# Patient Record
Sex: FEMALE | Race: WHITE | NOT HISPANIC OR LATINO | Employment: FULL TIME | URBAN - METROPOLITAN AREA
[De-identification: names, ages, dates, MRNs, and addresses within clinical notes are randomized per-mention and may not be internally consistent; named-entity substitution may affect disease eponyms.]

---

## 2017-04-07 ENCOUNTER — OFFICE VISIT (OUTPATIENT)
Dept: FAMILY MEDICINE | Facility: CLINIC | Age: 37
End: 2017-04-07
Payer: COMMERCIAL

## 2017-04-07 VITALS
TEMPERATURE: 98.1 F | DIASTOLIC BLOOD PRESSURE: 80 MMHG | RESPIRATION RATE: 16 BRPM | HEIGHT: 66 IN | SYSTOLIC BLOOD PRESSURE: 134 MMHG | OXYGEN SATURATION: 98 % | HEART RATE: 65 BPM | WEIGHT: 166 LBS | BODY MASS INDEX: 26.68 KG/M2

## 2017-04-07 DIAGNOSIS — K21.9 GASTROESOPHAGEAL REFLUX DISEASE WITHOUT ESOPHAGITIS: Primary | ICD-10-CM

## 2017-04-07 DIAGNOSIS — Z82.49 FAMILY HISTORY OF ISCHEMIC HEART DISEASE: ICD-10-CM

## 2017-04-07 DIAGNOSIS — F33.1 MAJOR DEPRESSIVE DISORDER, RECURRENT EPISODE, MODERATE (H): ICD-10-CM

## 2017-04-07 PROCEDURE — 99203 OFFICE O/P NEW LOW 30 MIN: CPT | Performed by: FAMILY MEDICINE

## 2017-04-07 PROCEDURE — 80061 LIPID PANEL: CPT | Performed by: FAMILY MEDICINE

## 2017-04-07 PROCEDURE — 36415 COLL VENOUS BLD VENIPUNCTURE: CPT | Performed by: FAMILY MEDICINE

## 2017-04-07 PROCEDURE — 82947 ASSAY GLUCOSE BLOOD QUANT: CPT | Performed by: FAMILY MEDICINE

## 2017-04-07 RX ORDER — FAMOTIDINE 20 MG
TABLET ORAL
COMMUNITY
End: 2018-12-11

## 2017-04-07 RX ORDER — OMEPRAZOLE 10 MG/1
CAPSULE, DELAYED RELEASE ORAL DAILY
COMMUNITY
End: 2018-03-23

## 2017-04-07 RX ORDER — GLUCOSAMINE SULFATE 500 MG
CAPSULE ORAL
COMMUNITY
End: 2018-12-11

## 2017-04-07 NOTE — NURSING NOTE
"Chief Complaint   Patient presents with     Establish Care     Blood Draw     fasting labs       Initial /80 (BP Location: Right arm, Patient Position: Chair, Cuff Size: Adult Regular)  Pulse 65  Temp 98.1  F (36.7  C) (Oral)  Resp 16  Ht 5' 6\" (1.676 m)  Wt 166 lb (75.3 kg)  SpO2 98%  BMI 26.79 kg/m2 Estimated body mass index is 26.79 kg/(m^2) as calculated from the following:    Height as of this encounter: 5' 6\" (1.676 m).    Weight as of this encounter: 166 lb (75.3 kg).  Medication Reconciliation: complete   Shayy Coates, STELLA      "

## 2017-04-07 NOTE — LETTER
[facility]  [id number] [place] [id number]  Phone: [phone]    April 7, 2017        January Dickens  [address] [facility] [address]          To whom it may concern:    RE: January Dcikens    Patient was seen and treated today at our clinic.  Pt will need more time for assignment due to death in the family, and recent travel out side the country.     Please contact me for questions or concerns.      Sincerely,        Len Matthews MD

## 2017-04-07 NOTE — MR AVS SNAPSHOT
"              After Visit Summary   4/7/2017    January Dickens    MRN: [id number]           Patient Information     Date Of Birth          1980        Visit Information        Provider Department      4/7/2017 10:45 AM Len Matthews MD [facility]        Today's Diagnoses     Gastroesophageal reflux disease without esophagitis    -  1    Major depressive disorder, recurrent episode, moderate (H)        Family history of ischemic heart disease           Follow-ups after your visit        Who to contact     If you have questions or need follow up information about today's clinic visit or your schedule please contact [facility] directly at [phone].  Normal or non-critical lab and imaging results will be communicated to you by MyChart, letter or phone within 4 business days after the clinic has received the results. If you do not hear from us within 7 days, please contact the clinic through [employer] or phone. If you have a critical or abnormal lab result, we will notify you by phone as soon as possible.  Submit refill requests through [employer] or call your pharmacy and they will forward the refill request to us. Please allow 3 business days for your refill to be completed.          Additional Information About Your Visit        MyChart Information     [employer] gives you secure access to your electronic health record. If you see a primary care provider, you can also send messages to your care team and make appointments. If you have questions, please call your primary care clinic.  If you do not have a primary care provider, please call [phone] and they will assist you.        Care EveryWhere ID     This is your Care EveryWhere ID. This could be used by other organizations to access your [place] medical records  [id number]        Your Vitals Were     Pulse Temperature Respirations Height Pulse Oximetry BMI (Body Mass Index)    65 98.1  F (36.7  C) (Oral) 16 5' 6\" (1.676 m) " 98% 26.79 kg/m2       Blood Pressure from Last 3 Encounters:   04/07/17 134/80   11/26/07 112/78   08/30/07 118/74    Weight from Last 3 Encounters:   04/07/17 166 lb (75.3 kg)   11/26/07 164 lb (74.4 kg)   08/30/07 160 lb (72.6 kg)              We Performed the Following     Glucose     Lipid Profile with reflex to direct LDL        Primary Care Provider Office Phone # Fax #    Len Matthews [phone]       [facility] [address] [facility] [address]        Thank you!     Thank you for choosing [facility]  for your care. Our goal is always to provide you with excellent care. Hearing back from our patients is one way we can continue to improve our services. Please take a few minutes to complete the written survey that you may receive in the mail after your visit with us. Thank you!             Your Updated Medication List - Protect others around you: Learn how to safely use, store and throw away your medicines at www.disposemymeds.org.          This list is accurate as of: 4/7/17 11:26 AM.  Always use your most recent med list.                   Brand Name Dispense Instructions for use    DIPROLENE 0.05 % lotion   Generic drug:  betamethasone (augmented)     60ml    apply to affected area twice daily       FLUoxetine 20 MG capsule    PROzac     Take 20 mg by mouth daily       glucosamine 500 MG Caps          omeprazole 10 MG CR capsule    priLOSEC     Take by mouth daily       TRIAMCINOLONE ACETONIDE (TOP) 0.1 % Crea     60    apply to affected area twice daily       Vitamin D (Cholecalciferol) 1000 UNITS Caps

## 2017-04-07 NOTE — Clinical Note
Please abstract the following data from this visit with this patient into the appropriate field in Epic:  Pap smear done on this date: 11/2016 (approximately), by this group: in [place], results were normal.
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
## 2017-04-08 LAB
CHOLEST SERPL-MCNC: 222 MG/DL
GLUCOSE SERPL-MCNC: 82 MG/DL (ref 70–99)
HDLC SERPL-MCNC: 55 MG/DL
LDLC SERPL CALC-MCNC: 158 MG/DL
NONHDLC SERPL-MCNC: 167 MG/DL
TRIGL SERPL-MCNC: 46 MG/DL

## 2017-04-10 ENCOUNTER — TELEPHONE (OUTPATIENT)
Dept: FAMILY MEDICINE | Facility: CLINIC | Age: 37
End: 2017-04-10

## 2017-04-10 DIAGNOSIS — F33.1 MAJOR DEPRESSIVE DISORDER, RECURRENT EPISODE, MODERATE (H): Primary | ICD-10-CM

## 2017-04-10 NOTE — LETTER
My Depression Action Plan  Name: January Dickens   Date of Birth 1980  Date: 4/14/2017    My doctor: Len Matthews   My clinic: [address] [address] [phone]          GREEN    ZONE   Good Control    What it looks like:     Things are going generally well. You have normal up s and down s. You may even feel depressed from time to time, but bad moods usually last less than a day.   What you need to do:  1. Continue to care for yourself (see self care plan)  2. Check your depression survival kit and update it as needed  3. Follow your physician s recommendations including any medication.  4. Do not stop taking medication unless you consult with your physician first.           YELLOW         ZONE Getting Worse    What it looks like:     Depression is starting to interfere with your life.     It may be hard to get out of bed; you may be starting to isolate yourself from others.    Symptoms of depression are starting to last most all day and this has happened for several days.     You may have suicidal thoughts but they are not constant.   What you need to do:     1. Call your care team, your response to treatment will improve if you keep your care team informed of your progress. Yellow periods are signs an adjustment may need to be made.     2. Continue your self-care, even if you have to fake it!    3. Talk to someone in your support network    4. Open up your depression survival kit           RED    ZONE Medical Alert - Get Help    What it looks like:     Depression is seriously interfering with your life.     You may experience these or other symptoms: You can t get out of bed most days, can t work or engage in other necessary activities, you have trouble taking care of basic hygiene, or basic responsibilities, thoughts of suicide or death that will not go away, self-injurious behavior.     What you need to do:  1. Call your care team  and request a same-day appointment. If they are not available (weekends or after hours) call your local crisis line, emergency room or 911.      Electronically signed by: Shayy Coates, April 14, 2017    Depression Self Care Plan / Survival Kit    Self-Care for Depression  Here s the deal. Your body and mind are really not as separate as most people think.  What you do and think affects how you feel and how you feel influences what you do and think. This means if you do things that people who feel good do, it will help you feel better.  Sometimes this is all it takes.  There is also a place for medication and therapy depending on how severe your depression is, so be sure to consult with your medical provider and/ or Behavioral Health Consultant if your symptoms are worsening or not improving.     In order to better manage my stress, I will:    Exercise  Get some form of exercise, every day. This will help reduce pain and release endorphins, the  feel good  chemicals in your brain. This is almost as good as taking antidepressants!  This is not the same as joining a gym and then never going! (they count on that by the way ) It can be as simple as just going for a walk or doing some gardening, anything that will get you moving.      Hygiene   Maintain good hygiene (Get out of bed in the morning, Make your bed, Brush your teeth, Take a shower, and Get dressed like you were going to work, even if you are unemployed).  If your clothes don't fit try to get ones that do.    Diet  I will strive to eat foods that are good for me, drink plenty of water, and avoid excessive sugar, caffeine, alcohol, and other mood-altering substances.  Some foods that are helpful in depression are: complex carbohydrates, B vitamins, flaxseed, fish or fish oil, fresh fruits and vegetables.    Psychotherapy  I agree to participate in Individual Therapy (if recommended).    Medication  If prescribed medications, I agree to take them.  Missing  doses can result in serious side effects.  I understand that drinking alcohol, or other illicit drug use, may cause potential side effects.  I will not stop my medication abruptly without first discussing it with my provider.    Staying Connected With Others  I will stay in touch with my friends, family members, and my primary care provider/team.    Use your imagination  Be creative.  We all have a creative side; it doesn t matter if it s oil painting, sand castles, or mud pies! This will also kick up the endorphins.    Witness Beauty  (AKA stop and smell the roses) Take a look outside, even in mid-winter. Notice colors, textures. Watch the squirrels and birds.     Service to others  Be of service to others.  There is always someone else in need.  By helping others we can  get out of ourselves  and remember the really important things.  This also provides opportunities for practicing all the other parts of the program.    Humor  Laugh and be silly!  Adjust your TV habits for less news and crime-drama and more comedy.    Control your stress  Try breathing deep, massage therapy, biofeedback, and meditation. Find time to relax each day.     My support system    Clinic Contact:  Phone number:    Contact 1:  Phone number:    Contact 2:  Phone number:    Rastafari/:  Phone number:    Therapist:  Phone number:    Local crisis center:    Phone number:    Other community support:  Phone number:

## 2017-04-11 NOTE — TELEPHONE ENCOUNTER
Panel Management Review       Patient has the following on her problem list:   Depression / Dysthymia review  No flowsheet data found.   Patient is due for:  PHQ9 and DAP      Composite cancer screening  Chart review shows that this patient is due/due soon for the following None  Summary:    Patient is due/failing the following:   PHQ9    Action needed:   Patient needs to do PHQ9.    Type of outreach:    Phone, left message for patient to call back.     Questions for provider review:    None                                                                                                                                    Sophie Washington CMA       Chart routed to Care Team .

## 2017-04-15 ASSESSMENT — PATIENT HEALTH QUESTIONNAIRE - PHQ9: SUM OF ALL RESPONSES TO PHQ QUESTIONS 1-9: 3

## 2017-05-05 ENCOUNTER — TELEPHONE (OUTPATIENT)
Dept: FAMILY MEDICINE | Facility: CLINIC | Age: 37
End: 2017-05-05

## 2017-05-05 NOTE — TELEPHONE ENCOUNTER
Patient calling for lab results.  Advised of below and discussed.  Has already started and lost good amount of weight.  Does not know how to get into her Mychart.  Gave Help Desk #.  Will reset her account.  Jane Gomez RN    Entered by Len Matthews MD at 4/8/2017 10:12 AM   Kamilla January, your cholesterol is slightly elevated, please make sure you start weight loss plan, exercise 3 hours/week, and start eating more vegetables, fruits, nuts, and fish products (twice weekly), and eat less of processed food, fast food, white bread/pasta, baked sweets, bagels,and fried food.   Saying that, your cholesterol is better than previous. Your blood sugar is normal.   Len Matthews MD   [facility]   [phone]

## 2017-05-22 ENCOUNTER — OFFICE VISIT (OUTPATIENT)
Dept: FAMILY MEDICINE | Facility: CLINIC | Age: 37
End: 2017-05-22
Payer: COMMERCIAL

## 2017-05-22 VITALS
HEIGHT: 66 IN | HEART RATE: 83 BPM | SYSTOLIC BLOOD PRESSURE: 132 MMHG | DIASTOLIC BLOOD PRESSURE: 84 MMHG | BODY MASS INDEX: 26.68 KG/M2 | RESPIRATION RATE: 20 BRPM | TEMPERATURE: 98.8 F | WEIGHT: 166 LBS | OXYGEN SATURATION: 95 %

## 2017-05-22 DIAGNOSIS — M62.838 NECK MUSCLE SPASM: Primary | ICD-10-CM

## 2017-05-22 PROCEDURE — 99213 OFFICE O/P EST LOW 20 MIN: CPT | Performed by: FAMILY MEDICINE
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
## 2017-05-22 NOTE — LETTER
[facility]  [id number] [place] [address]  Phone: [phone]    May 22, 2017        January Dickens  [address] [facility] [address]          To whom it may concern:    RE: January Dickens    Patient was seen and treated today at our clinic and missed work starting 5/20/2017  Patient may return to work 5/23/2107 with the following:  Light duty- no heavy lifting more than 10 pounds, no over head lifting more than 5 pounds.  When the patient returns to work, the following restrictions apply until :6/1/2017      Please contact me for questions or concerns.      Sincerely,        Len Matthews MD

## 2017-05-22 NOTE — MR AVS SNAPSHOT
"              After Visit Summary   5/22/2017    January Dickens    MRN: [id number]           Patient Information     Date Of Birth          1980        Visit Information        Provider Department      5/22/2017 10:30 AM Len Matthews MD [facility]        Today's Diagnoses     Neck muscle spasm    -  1       Follow-ups after your visit        Who to contact     If you have questions or need follow up information about today's clinic visit or your schedule please contact [facility] directly at [phone].  Normal or non-critical lab and imaging results will be communicated to you by MyChart, letter or phone within 4 business days after the clinic has received the results. If you do not hear from us within 7 days, please contact the clinic through [employer] or phone. If you have a critical or abnormal lab result, we will notify you by phone as soon as possible.  Submit refill requests through Ox[employer] or call your pharmacy and they will forward the refill request to us. Please allow 3 business days for your refill to be completed.          Additional Information About Your Visit        MyChart Information     Oxxy gives you secure access to your electronic health record. If you see a primary care provider, you can also send messages to your care team and make appointments. If you have questions, please call your primary care clinic.  If you do not have a primary care provider, please call [phone] and they will assist you.        Care EveryWhere ID     This is your Care EveryWhere ID. This could be used by other organizations to access your [place] medical records  [id number]        Your Vitals Were     Pulse Temperature Respirations Height Pulse Oximetry BMI (Body Mass Index)    83 98.8  F (37.1  C) (Oral) 20 5' 6\" (1.676 m) 95% 26.79 kg/m2       Blood Pressure from Last 3 Encounters:   05/22/17 132/84   04/07/17 134/80   11/26/07 112/78    Weight from Last 3 " Encounters:   05/22/17 166 lb (75.3 kg)   04/07/17 166 lb (75.3 kg)   11/26/07 164 lb (74.4 kg)              Today, you had the following     No orders found for display         Today's Medication Changes          These changes are accurate as of: 5/22/17 11:02 AM.  If you have any questions, ask your nurse or doctor.               Start taking these medicines.        Dose/Directions    diclofenac 50 MG EC tablet   Commonly known as:  VOLTAREN   Used for:  Neck muscle spasm   Started by:  Len Matthews MD        Dose:  50 mg   Take 1 tablet (50 mg) by mouth 3 times daily as needed for moderate pain   Quantity:  90 tablet   Refills:  1       tiZANidine 4 MG tablet   Commonly known as:  ZANAFLEX   Used for:  Neck muscle spasm   Started by:  Len Matthews MD        Dose:  4 mg   Take 1 tablet (4 mg) by mouth 3 times daily   Quantity:  30 tablet   Refills:  0            Where to get your medicines      These medications were sent to [address] [address]     Phone:  [phone]     diclofenac 50 MG EC tablet    tiZANidine 4 MG tablet                Primary Care Provider Office Phone # Fax #    Len Matthews [phone]       [address] [address]        Thank you!     Thank you for choosing [facility]  for your care. Our goal is always to provide you with excellent care. Hearing back from our patients is one way we can continue to improve our services. Please take a few minutes to complete the written survey that you may receive in the mail after your visit with us. Thank you!             Your Updated Medication List - Protect others around you: Learn how to safely use, store and throw away your medicines at www.disposemymeds.org.          This list is accurate as of: 5/22/17 11:02 AM.  Always use your most recent med list.                   Brand Name Dispense  Instructions for use    diclofenac 50 MG EC tablet    VOLTAREN    90 tablet    Take 1 tablet (50 mg) by mouth 3 times daily as needed for moderate pain       DIPROLENE 0.05 % lotion   Generic drug:  betamethasone (augmented)     60ml    apply to affected area twice daily       FLUoxetine 20 MG capsule    PROzac     Take 20 mg by mouth daily       glucosamine 500 MG Caps          omeprazole 10 MG CR capsule    priLOSEC     Take by mouth daily       tiZANidine 4 MG tablet    ZANAFLEX    30 tablet    Take 1 tablet (4 mg) by mouth 3 times daily       TRIAMCINOLONE ACETONIDE (TOP) 0.1 % Crea     60    apply to affected area twice daily       Vitamin D (Cholecalciferol) 1000 UNITS Caps

## 2017-05-22 NOTE — NURSING NOTE
"Chief Complaint   Patient presents with     Neck Pain     x6 days       Initial /84 (BP Location: Right arm, Patient Position: Chair, Cuff Size: Adult Regular)  Pulse 83  Temp 98.8  F (37.1  C) (Oral)  Resp 20  Ht 5' 6\" (1.676 m)  Wt 166 lb (75.3 kg)  SpO2 95%  BMI 26.79 kg/m2 Estimated body mass index is 26.79 kg/(m^2) as calculated from the following:    Height as of this encounter: 5' 6\" (1.676 m).    Weight as of this encounter: 166 lb (75.3 kg).  Medication Reconciliation: complete   Shayy Coates, STELLA      "

## 2017-08-08 ENCOUNTER — OFFICE VISIT (OUTPATIENT)
Dept: FAMILY MEDICINE | Facility: CLINIC | Age: 37
End: 2017-08-08
Payer: COMMERCIAL

## 2017-08-08 VITALS
DIASTOLIC BLOOD PRESSURE: 87 MMHG | SYSTOLIC BLOOD PRESSURE: 128 MMHG | TEMPERATURE: 98.2 F | OXYGEN SATURATION: 98 % | BODY MASS INDEX: 26.2 KG/M2 | RESPIRATION RATE: 16 BRPM | HEART RATE: 54 BPM | WEIGHT: 163 LBS | HEIGHT: 66 IN

## 2017-08-08 DIAGNOSIS — F41.1 GAD (GENERALIZED ANXIETY DISORDER): Primary | ICD-10-CM

## 2017-08-08 PROCEDURE — 99213 OFFICE O/P EST LOW 20 MIN: CPT | Performed by: FAMILY MEDICINE

## 2017-08-08 RX ORDER — FLUOXETINE 10 MG/1
10 CAPSULE ORAL DAILY
Qty: 14 CAPSULE | Refills: 1 | Status: SHIPPED | OUTPATIENT
Start: 2017-08-08 | End: 2018-03-23

## 2017-08-08 ASSESSMENT — ANXIETY QUESTIONNAIRES
6. BECOMING EASILY ANNOYED OR IRRITABLE: SEVERAL DAYS
GAD7 TOTAL SCORE: 4
2. NOT BEING ABLE TO STOP OR CONTROL WORRYING: SEVERAL DAYS
3. WORRYING TOO MUCH ABOUT DIFFERENT THINGS: SEVERAL DAYS
7. FEELING AFRAID AS IF SOMETHING AWFUL MIGHT HAPPEN: NOT AT ALL
1. FEELING NERVOUS, ANXIOUS, OR ON EDGE: SEVERAL DAYS
IF YOU CHECKED OFF ANY PROBLEMS ON THIS QUESTIONNAIRE, HOW DIFFICULT HAVE THESE PROBLEMS MADE IT FOR YOU TO DO YOUR WORK, TAKE CARE OF THINGS AT HOME, OR GET ALONG WITH OTHER PEOPLE: NOT DIFFICULT AT ALL
5. BEING SO RESTLESS THAT IT IS HARD TO SIT STILL: NOT AT ALL

## 2017-08-08 ASSESSMENT — PATIENT HEALTH QUESTIONNAIRE - PHQ9
5. POOR APPETITE OR OVEREATING: NOT AT ALL
SUM OF ALL RESPONSES TO PHQ QUESTIONS 1-9: 3

## 2017-08-08 NOTE — LETTER
[address] [address]  Phone: [phone]    August 8, 2017        January Dickens  [address] [facility] [address]          To whom it may concern:    RE: January Dickens    Patient was seen and treated today at our clinic for Major depression and anxiety symptoms.    Please contact me for questions or concerns.      Sincerely,        Len Matthews MD

## 2017-08-08 NOTE — NURSING NOTE
"Chief Complaint   Patient presents with     Medication Question     would like to stop taking Prozac       Initial /87 (BP Location: Right arm, Patient Position: Chair, Cuff Size: Adult Regular)  Pulse 54  Temp 98.2  F (36.8  C) (Oral)  Resp 16  Ht 5' 6\" (1.676 m)  Wt 163 lb (73.9 kg)  SpO2 98%  BMI 26.31 kg/m2 Estimated body mass index is 26.31 kg/(m^2) as calculated from the following:    Height as of this encounter: 5' 6\" (1.676 m).    Weight as of this encounter: 163 lb (73.9 kg).  Medication Reconciliation: complete   Shayy Coates, STELLA      "

## 2017-08-08 NOTE — MR AVS SNAPSHOT
"              After Visit Summary   8/8/2017    January Dickens    MRN: [id number]           Patient Information     Date Of Birth          1980        Visit Information        Provider Department      8/8/2017 9:30 AM Len Matthews MD [facility]        Today's Diagnoses     MERI (generalized anxiety disorder)    -  1       Follow-ups after your visit        Who to contact     If you have questions or need follow up information about today's clinic visit or your schedule please contact [facility] directly at [phone].  Normal or non-critical lab and imaging results will be communicated to you by whodoyouhart, letter or phone within 4 business days after the clinic has received the results. If you do not hear from us within 7 days, please contact the clinic through [employer] or phone. If you have a critical or abnormal lab result, we will notify you by phone as soon as possible.  Submit refill requests through [employer] or call your pharmacy and they will forward the refill request to us. Please allow 3 business days for your refill to be completed.          Additional Information About Your Visit        MyChart Information     [employer] gives you secure access to your electronic health record. If you see a primary care provider, you can also send messages to your care team and make appointments. If you have questions, please call your primary care clinic.  If you do not have a primary care provider, please call [phone] and they will assist you.        Care EveryWhere ID     This is your Care EveryWhere ID. This could be used by other organizations to access your [place] medical records  [id number]        Your Vitals Were     Pulse Temperature Respirations Height Pulse Oximetry BMI (Body Mass Index)    54 98.2  F (36.8  C) (Oral) 16 5' 6\" (1.676 m) 98% 26.31 kg/m2       Blood Pressure from Last 3 Encounters:   08/08/17 128/87   05/22/17 132/84   04/07/17 134/80    Weight from " Last 3 Encounters:   08/08/17 163 lb (73.9 kg)   05/22/17 166 lb (75.3 kg)   04/07/17 166 lb (75.3 kg)              Today, you had the following     No orders found for display         Today's Medication Changes          These changes are accurate as of: 8/8/17  9:56 AM.  If you have any questions, ask your nurse or doctor.               These medicines have changed or have updated prescriptions.        Dose/Directions    * FLUoxetine 20 MG capsule   Commonly known as:  PROzac   This may have changed:  Another medication with the same name was added. Make sure you understand how and when to take each.   Changed by:  Len Matthews MD        Dose:  20 mg   Take 20 mg by mouth daily   Refills:  0       * FLUoxetine 10 MG capsule   Commonly known as:  PROzac   This may have changed:  You were already taking a medication with the same name, and this prescription was added. Make sure you understand how and when to take each.   Used for:  MERI (generalized anxiety disorder)   Changed by:  Len Matthews MD        Dose:  10 mg   Take 1 capsule (10 mg) by mouth daily   Quantity:  14 capsule   Refills:  1       * Notice:  This list has 2 medication(s) that are the same as other medications prescribed for you. Read the directions carefully, and ask your doctor or other care provider to review them with you.         Where to get your medicines      These medications were sent to [address] [address]     Phone:  [phone]     FLUoxetine 10 MG capsule                Primary Care Provider Office Phone # Fax #    Len Matthews [phone]       [address] [address]        Equal Access to Services     CANDY MANJARREZ : june Escalona, taylor hernandez. So [facility] [phone].    ATENCIÓN: Si aster andino pennington  disposición servicios gratuitos de asistencia lingüística. Jean-Paul carr [phone].    We comply with applicable federal civil rights laws and [place] laws. We do not discriminate on the basis of race, color, national origin, age, disability sex, sexual orientation or gender identity.            Thank you!     Thank you for choosing [facility]  for your care. Our goal is always to provide you with excellent care. Hearing back from our patients is one way we can continue to improve our services. Please take a few minutes to complete the written survey that you may receive in the mail after your visit with us. Thank you!             Your Updated Medication List - Protect others around you: Learn how to safely use, store and throw away your medicines at www.disposemymeds.org.          This list is accurate as of: 8/8/17  9:56 AM.  Always use your most recent med list.                   Brand Name Dispense Instructions for use Diagnosis    DIPROLENE 0.05 % lotion   Generic drug:  betamethasone (augmented)     60ml    apply to affected area twice daily    Other psoriasis       * FLUoxetine 20 MG capsule    PROzac     Take 20 mg by mouth daily        * FLUoxetine 10 MG capsule    PROzac    14 capsule    Take 1 capsule (10 mg) by mouth daily    MERI (generalized anxiety disorder)       glucosamine 500 MG Caps           omeprazole 10 MG CR capsule    priLOSEC     Take by mouth daily        TRIAMCINOLONE ACETONIDE (TOP) 0.1 % Crea     60    apply to affected area twice daily    Other psoriasis       Vitamin D (Cholecalciferol) 1000 UNITS Caps           * Notice:  This list has 2 medication(s) that are the same as other medications prescribed for you. Read the directions carefully, and ask your doctor or other care provider to review them with you.

## 2017-08-09 ASSESSMENT — ANXIETY QUESTIONNAIRES: GAD7 TOTAL SCORE: 4

## 2018-03-23 ENCOUNTER — OFFICE VISIT (OUTPATIENT)
Dept: FAMILY MEDICINE | Facility: CLINIC | Age: 38
End: 2018-03-23
Payer: COMMERCIAL

## 2018-03-23 VITALS
HEART RATE: 76 BPM | TEMPERATURE: 98.3 F | WEIGHT: 157 LBS | DIASTOLIC BLOOD PRESSURE: 85 MMHG | HEIGHT: 66 IN | BODY MASS INDEX: 25.23 KG/M2 | SYSTOLIC BLOOD PRESSURE: 134 MMHG

## 2018-03-23 DIAGNOSIS — Z00.00 ROUTINE GENERAL MEDICAL EXAMINATION AT A HEALTH CARE FACILITY: Primary | ICD-10-CM

## 2018-03-23 DIAGNOSIS — H81.13 BENIGN PAROXYSMAL POSITIONAL VERTIGO DUE TO BILATERAL VESTIBULAR DISORDER: ICD-10-CM

## 2018-03-23 DIAGNOSIS — F33.1 MAJOR DEPRESSIVE DISORDER, RECURRENT EPISODE, MODERATE (H): ICD-10-CM

## 2018-03-23 DIAGNOSIS — Z23 NEED FOR PROPHYLACTIC VACCINATION WITH TETANUS-DIPHTHERIA (TD): ICD-10-CM

## 2018-03-23 PROBLEM — R20.0 RIGHT LEG NUMBNESS: Status: ACTIVE | Noted: 2018-03-23

## 2018-03-23 LAB
ERYTHROCYTE [DISTWIDTH] IN BLOOD BY AUTOMATED COUNT: 11.3 % (ref 10–15)
HCT VFR BLD AUTO: 39.7 % (ref 35–47)
HGB BLD-MCNC: 13.8 G/DL (ref 11.7–15.7)
MCH RBC QN AUTO: 32.9 PG (ref 26.5–33)
MCHC RBC AUTO-ENTMCNC: 34.8 G/DL (ref 31.5–36.5)
MCV RBC AUTO: 95 FL (ref 78–100)
PLATELET # BLD AUTO: 272 10E9/L (ref 150–450)
RBC # BLD AUTO: 4.2 10E12/L (ref 3.8–5.2)
WBC # BLD AUTO: 7 10E9/L (ref 4–11)

## 2018-03-23 PROCEDURE — 85027 COMPLETE CBC AUTOMATED: CPT | Performed by: FAMILY MEDICINE

## 2018-03-23 PROCEDURE — 99395 PREV VISIT EST AGE 18-39: CPT | Performed by: FAMILY MEDICINE

## 2018-03-23 PROCEDURE — 36415 COLL VENOUS BLD VENIPUNCTURE: CPT | Performed by: FAMILY MEDICINE

## 2018-03-23 PROCEDURE — 80061 LIPID PANEL: CPT | Performed by: FAMILY MEDICINE

## 2018-03-23 PROCEDURE — 99213 OFFICE O/P EST LOW 20 MIN: CPT | Mod: 25 | Performed by: FAMILY MEDICINE
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
## 2018-03-24 LAB
CHOLEST SERPL-MCNC: 217 MG/DL
HDLC SERPL-MCNC: 45 MG/DL
LDLC SERPL CALC-MCNC: 156 MG/DL
NONHDLC SERPL-MCNC: 172 MG/DL
TRIGL SERPL-MCNC: 80 MG/DL

## 2018-12-11 ENCOUNTER — OFFICE VISIT (OUTPATIENT)
Dept: FAMILY MEDICINE | Facility: CLINIC | Age: 38
End: 2018-12-11
Payer: COMMERCIAL

## 2018-12-11 VITALS
HEIGHT: 66 IN | OXYGEN SATURATION: 100 % | RESPIRATION RATE: 16 BRPM | HEART RATE: 68 BPM | SYSTOLIC BLOOD PRESSURE: 117 MMHG | DIASTOLIC BLOOD PRESSURE: 79 MMHG | BODY MASS INDEX: 24.75 KG/M2 | WEIGHT: 154 LBS | TEMPERATURE: 98.3 F

## 2018-12-11 DIAGNOSIS — L40.9 PSORIASIS: ICD-10-CM

## 2018-12-11 PROCEDURE — 99213 OFFICE O/P EST LOW 20 MIN: CPT | Performed by: FAMILY MEDICINE

## 2018-12-11 RX ORDER — BETAMETHASONE DIPROPIONATE 0.5 MG/ML
LOTION, AUGMENTED TOPICAL 2 TIMES DAILY
Qty: 60 ML | Refills: 11 | Status: SHIPPED | OUTPATIENT
Start: 2018-12-11 | End: 2020-01-08

## 2018-12-11 RX ORDER — TRIAMCINOLONE ACETONIDE 1 MG/G
CREAM TOPICAL 2 TIMES DAILY
Qty: 85.2 G | Refills: 3 | Status: SHIPPED | OUTPATIENT
Start: 2018-12-11 | End: 2020-03-31

## 2018-12-11 ASSESSMENT — MIFFLIN-ST. JEOR: SCORE: 1395.29
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
## 2019-05-14 ENCOUNTER — MYC REFILL (OUTPATIENT)
Dept: FAMILY MEDICINE | Facility: CLINIC | Age: 39
End: 2019-05-14

## 2019-05-14 DIAGNOSIS — L40.9 PSORIASIS: ICD-10-CM

## 2019-05-15 RX ORDER — BETAMETHASONE DIPROPIONATE 0.5 MG/ML
LOTION, AUGMENTED TOPICAL 2 TIMES DAILY
Start: 2019-05-15

## 2019-09-30 ENCOUNTER — HEALTH MAINTENANCE LETTER (OUTPATIENT)
Age: 39
End: 2019-09-30

## 2019-11-18 ENCOUNTER — TELEPHONE (OUTPATIENT)
Dept: FAMILY MEDICINE | Facility: CLINIC | Age: 39
End: 2019-11-18

## 2019-11-18 NOTE — LETTER
[facility]  [address] [facility] [address]  [phone]  December 5, 2019    January Dickens  [address] [place] [id number]    Dear January,    I care about your health and have reviewed your health plan. I have reviewed your medical conditions, medication list, and lab results and am making recommendations based on this review, to better manage your health.    You are in particular need of attention regarding:  -Depression  -Cervical Cancer Screening    I am recommending that you:  {recommendations:-schedule a PAP SMEAR EXAM which is due.  Please disregard this reminder if you have had this exam elsewhere within the last year.  It would be helpful for us to have a copy of your recent pap smear report in our file so that we can best coordinate your care.  Please complete enclosed PHQ-9 form and return in envelope provided.       Here is a list of Health Maintenance topics that are due now or due soon:  Health Maintenance Due   Topic Date Due     ANNUAL REVIEW OF HM ORDERS  1980     HIV SCREENING  06/12/1995     PNEUMOCOCCAL IMMUNIZATION 19-64 MEDIUM RISK (1 of 1 - PPSV23) 06/12/1999     HPV  06/12/2001     PHQ-9  02/08/2018     PREVENTIVE CARE VISIT  03/23/2019     INFLUENZA VACCINE (1) 09/01/2019       Please call us at [phone] (or use SurveyGizmo) to address the above recommendations.       Thank you for trusting [facility] and we appreciate the opportunity to serve you.  We look forward to supporting your healthcare needs in the future.    Healthy Regards,    Len Matthews MD

## 2019-11-18 NOTE — TELEPHONE ENCOUNTER
Panel Management Review      Patient has the following on her problem list:   Depression / Dysthymia review    Measure:  Needs PHQ-9 score of 4 or less during index window.  Administer PHQ-9 and if score is 5 or more, send encounter to provider for next steps.    5 - 7 month window range:     PHQ-9 SCORE 4/14/2017 8/8/2017   PHQ-9 Total Score 3 3       If PHQ-9 recheck is 5 or more, route to provider for next steps.    Patient is due for:  PHQ9      Composite cancer screening  Chart review shows that this patient is due/due soon for the following Pap Smear  Summary:    Patient is due/failing the following:   PAP and PHQ9    Action needed:   Patient needs office visit for pap screen. and Patient needs to do PHQ9.    Type of outreach:    Phone, left message for patient to call back.     Questions for provider review:    None                                                                                                                                    Shayy Coates CMA       Chart routed to Care Team .

## 2019-12-05 NOTE — TELEPHONE ENCOUNTER
Type of outreach:  Phone, left message for patient to call back.  and Sent letter.  Shayy Coates, CMA

## 2020-01-08 ENCOUNTER — OFFICE VISIT (OUTPATIENT)
Dept: FAMILY MEDICINE | Facility: CLINIC | Age: 40
End: 2020-01-08
Payer: COMMERCIAL

## 2020-01-08 ENCOUNTER — TELEPHONE (OUTPATIENT)
Dept: FAMILY MEDICINE | Facility: CLINIC | Age: 40
End: 2020-01-08

## 2020-01-08 VITALS
HEART RATE: 64 BPM | HEIGHT: 66 IN | RESPIRATION RATE: 16 BRPM | OXYGEN SATURATION: 98 % | TEMPERATURE: 98.1 F | BODY MASS INDEX: 24.17 KG/M2 | DIASTOLIC BLOOD PRESSURE: 81 MMHG | WEIGHT: 150.4 LBS | SYSTOLIC BLOOD PRESSURE: 122 MMHG

## 2020-01-08 DIAGNOSIS — F17.200 TOBACCO USE DISORDER: ICD-10-CM

## 2020-01-08 DIAGNOSIS — L40.9 PSORIASIS: ICD-10-CM

## 2020-01-08 DIAGNOSIS — Z00.00 ROUTINE HISTORY AND PHYSICAL EXAMINATION OF ADULT: Primary | ICD-10-CM

## 2020-01-08 DIAGNOSIS — F33.1 MAJOR DEPRESSIVE DISORDER, RECURRENT EPISODE, MODERATE (H): ICD-10-CM

## 2020-01-08 PROCEDURE — 80061 LIPID PANEL: CPT | Performed by: FAMILY MEDICINE

## 2020-01-08 PROCEDURE — G0476 HPV COMBO ASSAY CA SCREEN: HCPCS | Performed by: FAMILY MEDICINE

## 2020-01-08 PROCEDURE — 82947 ASSAY GLUCOSE BLOOD QUANT: CPT | Performed by: FAMILY MEDICINE

## 2020-01-08 PROCEDURE — 87624 HPV HI-RISK TYP POOLED RSLT: CPT | Performed by: FAMILY MEDICINE

## 2020-01-08 PROCEDURE — 36415 COLL VENOUS BLD VENIPUNCTURE: CPT | Performed by: FAMILY MEDICINE

## 2020-01-08 PROCEDURE — G0145 SCR C/V CYTO,THINLAYER,RESCR: HCPCS | Performed by: FAMILY MEDICINE

## 2020-01-08 PROCEDURE — 99395 PREV VISIT EST AGE 18-39: CPT | Performed by: FAMILY MEDICINE

## 2020-01-08 RX ORDER — BETAMETHASONE DIPROPIONATE 0.5 MG/ML
LOTION, AUGMENTED TOPICAL 2 TIMES DAILY
Qty: 60 ML | Refills: 11 | Status: SHIPPED | OUTPATIENT
Start: 2020-01-08 | End: 2020-03-31

## 2020-01-08 RX ORDER — NICOTINE 21 MG/24HR
1 PATCH, TRANSDERMAL 24 HOURS TRANSDERMAL EVERY 24 HOURS
Qty: 30 PATCH | Refills: 11 | Status: SHIPPED | OUTPATIENT
Start: 2020-01-08 | End: 2020-03-31

## 2020-01-08 ASSESSMENT — ANXIETY QUESTIONNAIRES
GAD7 TOTAL SCORE: 5
6. BECOMING EASILY ANNOYED OR IRRITABLE: SEVERAL DAYS
GAD7 TOTAL SCORE: 5
7. FEELING AFRAID AS IF SOMETHING AWFUL MIGHT HAPPEN: NOT AT ALL
5. BEING SO RESTLESS THAT IT IS HARD TO SIT STILL: NOT AT ALL
7. FEELING AFRAID AS IF SOMETHING AWFUL MIGHT HAPPEN: NOT AT ALL
4. TROUBLE RELAXING: NOT AT ALL
1. FEELING NERVOUS, ANXIOUS, OR ON EDGE: MORE THAN HALF THE DAYS
2. NOT BEING ABLE TO STOP OR CONTROL WORRYING: SEVERAL DAYS
GAD7 TOTAL SCORE: 5
3. WORRYING TOO MUCH ABOUT DIFFERENT THINGS: SEVERAL DAYS

## 2020-01-08 ASSESSMENT — PATIENT HEALTH QUESTIONNAIRE - PHQ9
10. IF YOU CHECKED OFF ANY PROBLEMS, HOW DIFFICULT HAVE THESE PROBLEMS MADE IT FOR YOU TO DO YOUR WORK, TAKE CARE OF THINGS AT HOME, OR GET ALONG WITH OTHER PEOPLE: NOT DIFFICULT AT ALL
SUM OF ALL RESPONSES TO PHQ QUESTIONS 1-9: 3
SUM OF ALL RESPONSES TO PHQ QUESTIONS 1-9: 3

## 2020-01-08 ASSESSMENT — MIFFLIN-ST. JEOR: SCORE: 1366.02

## 2020-01-08 NOTE — TELEPHONE ENCOUNTER
When patient was leaving office visit today, she forgot to ask Dr. Matthews for refill on  Diprolene Lotion, please send to [facility]  Dale Martins MA
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
## 2020-01-09 LAB
CHOLEST SERPL-MCNC: 204 MG/DL
GLUCOSE SERPL-MCNC: 90 MG/DL (ref 70–99)
HDLC SERPL-MCNC: 73 MG/DL
LDLC SERPL CALC-MCNC: 125 MG/DL
NONHDLC SERPL-MCNC: 131 MG/DL
TRIGL SERPL-MCNC: 32 MG/DL

## 2020-01-09 ASSESSMENT — ANXIETY QUESTIONNAIRES: GAD7 TOTAL SCORE: 5

## 2020-01-09 ASSESSMENT — PATIENT HEALTH QUESTIONNAIRE - PHQ9: SUM OF ALL RESPONSES TO PHQ QUESTIONS 1-9: 3

## 2020-01-13 LAB
COPATH REPORT: NORMAL
PAP: NORMAL

## 2020-01-14 LAB
FINAL DIAGNOSIS: NORMAL
HPV HR 12 DNA CVX QL NAA+PROBE: NEGATIVE
HPV16 DNA SPEC QL NAA+PROBE: NEGATIVE
HPV18 DNA SPEC QL NAA+PROBE: NEGATIVE
SPECIMEN DESCRIPTION: NORMAL
SPECIMEN SOURCE CVX/VAG CYTO: NORMAL

## 2020-03-31 ENCOUNTER — VIRTUAL VISIT (OUTPATIENT)
Dept: FAMILY MEDICINE | Facility: CLINIC | Age: 40
End: 2020-03-31
Payer: COMMERCIAL

## 2020-03-31 DIAGNOSIS — F17.200 TOBACCO USE DISORDER: ICD-10-CM

## 2020-03-31 DIAGNOSIS — L40.9 PSORIASIS: ICD-10-CM

## 2020-03-31 DIAGNOSIS — R63.4 WEIGHT LOSS: Primary | ICD-10-CM

## 2020-03-31 PROCEDURE — 99442 ZZC PHYSICIAN TELEPHONE EVALUATION 11-20 MIN: CPT | Mod: TEL | Performed by: FAMILY MEDICINE

## 2020-03-31 RX ORDER — BETAMETHASONE DIPROPIONATE 0.5 MG/ML
LOTION, AUGMENTED TOPICAL 2 TIMES DAILY
Qty: 60 ML | Refills: 11 | Status: SHIPPED | OUTPATIENT
Start: 2020-03-31 | End: 2021-06-04

## 2020-03-31 RX ORDER — TRIAMCINOLONE ACETONIDE 1 MG/G
CREAM TOPICAL 2 TIMES DAILY
Qty: 85.2 G | Refills: 3 | Status: SHIPPED | OUTPATIENT
Start: 2020-03-31 | End: 2021-06-04

## 2020-03-31 RX ORDER — NICOTINE 21 MG/24HR
1 PATCH, TRANSDERMAL 24 HOURS TRANSDERMAL EVERY 24 HOURS
Qty: 30 PATCH | Refills: 11 | Status: SHIPPED | OUTPATIENT
Start: 2020-03-31 | End: 2023-09-01
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
## 2020-04-01 DIAGNOSIS — R63.4 WEIGHT LOSS: ICD-10-CM

## 2020-04-01 LAB
CRP SERPL-MCNC: <2.9 MG/L (ref 0–8)
ERYTHROCYTE [DISTWIDTH] IN BLOOD BY AUTOMATED COUNT: 11.5 % (ref 10–15)
ERYTHROCYTE [SEDIMENTATION RATE] IN BLOOD BY WESTERGREN METHOD: 8 MM/H (ref 0–20)
HCT VFR BLD AUTO: 36.9 % (ref 35–47)
HGB BLD-MCNC: 12.6 G/DL (ref 11.7–15.7)
MCH RBC QN AUTO: 31.7 PG (ref 26.5–33)
MCHC RBC AUTO-ENTMCNC: 34.1 G/DL (ref 31.5–36.5)
MCV RBC AUTO: 93 FL (ref 78–100)
PLATELET # BLD AUTO: 270 10E9/L (ref 150–450)
RBC # BLD AUTO: 3.97 10E12/L (ref 3.8–5.2)
WBC # BLD AUTO: 7.2 10E9/L (ref 4–11)

## 2020-04-01 PROCEDURE — 80048 BASIC METABOLIC PNL TOTAL CA: CPT | Performed by: FAMILY MEDICINE

## 2020-04-01 PROCEDURE — 36415 COLL VENOUS BLD VENIPUNCTURE: CPT | Performed by: FAMILY MEDICINE

## 2020-04-01 PROCEDURE — 85027 COMPLETE CBC AUTOMATED: CPT | Performed by: FAMILY MEDICINE

## 2020-04-01 PROCEDURE — 86140 C-REACTIVE PROTEIN: CPT | Performed by: FAMILY MEDICINE

## 2020-04-01 PROCEDURE — 84443 ASSAY THYROID STIM HORMONE: CPT | Performed by: FAMILY MEDICINE

## 2020-04-01 PROCEDURE — 85652 RBC SED RATE AUTOMATED: CPT | Performed by: FAMILY MEDICINE

## 2020-04-02 LAB
ANION GAP SERPL CALCULATED.3IONS-SCNC: 6 MMOL/L (ref 3–14)
BUN SERPL-MCNC: 9 MG/DL (ref 7–30)
CALCIUM SERPL-MCNC: 8.6 MG/DL (ref 8.5–10.1)
CHLORIDE SERPL-SCNC: 105 MMOL/L (ref 94–109)
CO2 SERPL-SCNC: 26 MMOL/L (ref 20–32)
CREAT SERPL-MCNC: 0.57 MG/DL (ref 0.52–1.04)
GFR SERPL CREATININE-BSD FRML MDRD: >90 ML/MIN/{1.73_M2}
GLUCOSE SERPL-MCNC: 89 MG/DL (ref 70–99)
POTASSIUM SERPL-SCNC: 3.9 MMOL/L (ref 3.4–5.3)
SODIUM SERPL-SCNC: 137 MMOL/L (ref 133–144)
TSH SERPL DL<=0.005 MIU/L-ACNC: 1.51 MU/L (ref 0.4–4)

## 2020-09-19 ENCOUNTER — VIRTUAL VISIT (OUTPATIENT)
Dept: FAMILY MEDICINE | Facility: OTHER | Age: 40
End: 2020-09-19

## 2020-09-20 NOTE — PROGRESS NOTES
"Date: 2020 20:40:07  Clinician: Nicole Garcia  Clinician NPI: [id number]  Patient: January Dickens  Patient : 1980  Patient Address: [address] [place] [address]  Patient Phone: [phone]  Visit Protocol: URI  Patient Summary:  January is a 40 year old ( : 1980 ) female who initiated a [facility] Visit for COVID-19 (Coronavirus) evaluation and screening. When asked the question \"Please sign me up to receive news, health information and promotions. \", January responded \"No\".    When asked when her symptoms started, January reported that she does not have any symptoms.   She denies having recent facial or sinus surgery in the past 60 days.    Pertinent COVID-19 (Coronavirus) information  In the past 14 days, January has not worked in a congregate living setting.   She does not work or volunteer as healthcare worker or a  and does not work or volunteer in a healthcare facility.   January also has not lived in a congregate living setting in the past 14 days. She does not live with a healthcare worker.   January has had a close contact with a laboratory-confirmed COVID-19 patient in the last 14 days. Additional information about contact with COVID-19 (Coronavirus) patient as reported by the patient (free text): My brother on 2020 in his kitchen spaced about 4 feet apart talked for 45 mins   Patient reported they are not living in the same household with a COVID-19 positive patient.  Patient was in an enclosed space for greater than 15 minutes with a COVID-19 patient.  Since 2019, January and has not had upper respiratory infection or influenza-like illness. Has not been diagnosed with lab-confirmed COVID-19 test   Pertinent medical history  January has taken an antibiotic medication in the past month. Antibiotic details as reported by the patient (free text): Amoxicillin 500mg, gum abscess 2020   January does not get yeast infections when she takes antibiotics.   " January does not need a return to work/school note.   Weight: 140 lbs   January smokes or uses smokeless tobacco.   She denies pregnancy and denies breastfeeding. She has menstruated in the past month.   Weight: 140 lbs    MEDICATIONS: No current medications, ALLERGIES: NKDA  Clinician Response:  Dear January,         Your symptoms show that you may have coronavirus (COVID-19). This&nbsp;illness can cause fever, cough and trouble breathing. Many people get a mild case and get better on their own. Some people can get very sick.  What should I do?  We would like to test you for this virus.  1. Please call [phone] to schedule your visit. Explain that you were referred by [facility] to have a COVID-19 test. Be ready to share your [facility] visit ID number.  The following will serve as your written order for this COVID Test, ordered by me, for the indication of suspected COVID [Z20.828]: The test will be ordered in [employer], our electronic health record, after you are scheduled. It will show as ordered and authorized by Cornell Bush MD.  Order: COVID-19 (Coronavirus) PCR for SYMPTOMATIC testing from [facility].    2. When it's time for your COVID test:  Stay at least 6 feet away from others. (If someone will drive you to your test, stay in the backseat, as far away from the  as you can.)  Cover your mouth and nose with a mask, tissue or washcloth.  Go straight to the testing site. Don't make any stops on the way there or back.    3.Starting now:&nbsp;Stay home and away from others (self-isolate) until:   You've had&nbsp;no&nbsp;fever---and no medicine that reduces fever---for one full day (24 hours).&nbsp;And...  Your other symptoms have gotten better. For example, your cough or breathing has improved.&nbsp;And...  At least&nbsp;10 days&nbsp;have passed since your symptoms started.    During this time, don't leave the house except for testing or medical care.   Stay in your own room, even for meals. Use your own bathroom if you  "can.  Stay away from others in your home. No hugging, kissing or shaking hands. No visitors.  Don't go to work, school or anywhere else.   Clean \"high touch\" surfaces often (doorknobs, counters, handles, etc.). Use a household cleaning spray or wipes. You'll find a full list of  on the EPA website:&nbsp;www.epa.gov/pesticide-registration/list-n-disinfectants-use-against-sars-cov-2.   Cover your mouth and nose with a mask, tissue or washcloth to avoid spreading germs.  Wash your hands and face often. Use soap and water.  Caregivers in these groups are at risk for severe illness due to COVID-19:  o People 65 years and older  o People who live in a nursing home or long-term care facility  o People with chronic disease (lung, heart, cancer, diabetes, kidney, liver, immunologic)  o People who have a weakened immune system, including those who:   Are in cancer treatment  Take medicine that weakens the immune system, such as corticosteroids  Had a bone marrow or organ transplant  Have an immune deficiency  Have poorly controlled HIV or AIDS  Are obese (body mass index of 40 or higher)  Smoke regularly   o Caregivers should wear gloves while washing dishes, handling laundry and cleaning bedrooms and bathrooms.  o Use caution when washing and drying laundry: Don't shake dirty laundry, and use the warmest water setting that you can.  o For more tips, go to&nbsp;www.cdc.gov/coronavirus/2019-ncov/downloads/10Things.pdf.   How can I take care of myself?    Get lots of rest. Drink extra fluids&nbsp;(unless a doctor has told you not to).  Take Tylenol (acetaminophen) for fever or pain.&nbsp;If you have liver or kidney problems, ask your family doctor if it's okay to take Tylenol.   Adults can take either:   650 mg (two 325 mg pills) every 4 to 6 hours,&nbsp;or...  1,000 mg (two 500 mg pills) every 8 hours as needed.  Note:&nbsp;Don't take more than 3,000 mg in one day. Acetaminophen is found in many medicines (both " prescribed and over-the-counter medicines). Read all labels to be sure you don't take too much.   For children, check the Tylenol bottle for the right dose. The dose is based on the child's age or weight.   If you have other health problems (like cancer, heart failure, an organ transplant or severe kidney disease):&nbsp;Call your specialty clinic if you don't feel better in the next 2 days.    Know when to call 911.&nbsp;Emergency warning signs include:   Trouble breathing or shortness of breath Pain or pressure in the chest that doesn't go away Feeling confused like you haven't felt before, or not being able to wake up Bluish-colored lips or face.  Where can I get more information?   [facility] -- About COVID-19:&nbsp;[email or web address]  CDC -- What to Do If You're Sick:&nbsp;www.cdc.gov/coronavirus/2019-ncov/about/steps-when-sick.html  CDC -- Ending Home Isolation:&nbsp;www.cdc.gov/coronavirus/2019-ncov/hcp/disposition-in-home-patients.html  [facility] -- Caring for Someone:&nbsp;www.cdc.gov/coronavirus/2019-ncov/if-you-are-sick/care-for-someone.html  [facility] -- Interim Guidance for Hospital Discharge to Home:&nbsp;www.health.[facility].mn./diseases/coronavirus/hcp/hospdischarge.pdf  [facility] clinical trials (COVID-19 research studies):&nbsp;clinicalaffairs.[email or web address]/umn-clinical-trials  Below are the COVID-19 hotlines at the [facility] of [facility] ([facility]). Interpreters are available.   For health questions: Call 943-234-2321 or 1-997.133.7486 (7 a.m. to 7 p.m.) For questions about schools and childcare: Call 746-557-0043 or 1-975.199.1941 (7 a.m. to 7 p.m.)           Diagnosis: Contact with and (suspected) exposure to intestinal infectious diseases due to Escherichia coli (E. coli)  Diagnosis ICD: Z20.01

## 2020-09-21 DIAGNOSIS — Z20.822 SUSPECTED 2019 NOVEL CORONAVIRUS INFECTION: Primary | ICD-10-CM

## 2020-09-21 PROCEDURE — U0003 INFECTIOUS AGENT DETECTION BY NUCLEIC ACID (DNA OR RNA); SEVERE ACUTE RESPIRATORY SYNDROME CORONAVIRUS 2 (SARS-COV-2) (CORONAVIRUS DISEASE [COVID-19]), AMPLIFIED PROBE TECHNIQUE, MAKING USE OF HIGH THROUGHPUT TECHNOLOGIES AS DESCRIBED BY CMS-2020-01-R: HCPCS | Performed by: FAMILY MEDICINE

## 2020-09-22 LAB
SARS-COV-2 RNA SPEC QL NAA+PROBE: NOT DETECTED
SPECIMEN SOURCE: NORMAL

## 2021-01-15 ENCOUNTER — HEALTH MAINTENANCE LETTER (OUTPATIENT)
Age: 41
End: 2021-01-15

## 2021-03-14 ENCOUNTER — HEALTH MAINTENANCE LETTER (OUTPATIENT)
Age: 41
End: 2021-03-14

## 2021-06-02 DIAGNOSIS — L40.9 PSORIASIS: ICD-10-CM

## 2021-06-04 RX ORDER — TRIAMCINOLONE ACETONIDE 1 MG/G
CREAM TOPICAL 2 TIMES DAILY
Qty: 85.2 G | Refills: 1 | Status: SHIPPED | OUTPATIENT
Start: 2021-06-04 | End: 2023-12-07

## 2021-06-04 RX ORDER — BETAMETHASONE DIPROPIONATE 0.5 MG/ML
LOTION, AUGMENTED TOPICAL 2 TIMES DAILY
Qty: 60 ML | Refills: 1 | Status: SHIPPED | OUTPATIENT
Start: 2021-06-04 | End: 2023-12-07

## 2021-06-10 SDOH — ECONOMIC STABILITY: INCOME INSECURITY: IN THE LAST 12 MONTHS, WAS THERE A TIME WHEN YOU WERE NOT ABLE TO PAY THE MORTGAGE OR RENT ON TIME?: NO

## 2021-06-30 ENCOUNTER — TELEPHONE (OUTPATIENT)
Dept: FAMILY MEDICINE | Facility: CLINIC | Age: 41
End: 2021-06-30

## 2021-06-30 ENCOUNTER — OFFICE VISIT (OUTPATIENT)
Dept: FAMILY MEDICINE | Facility: CLINIC | Age: 41
End: 2021-06-30
Payer: COMMERCIAL

## 2021-06-30 VITALS
TEMPERATURE: 98.5 F | DIASTOLIC BLOOD PRESSURE: 80 MMHG | SYSTOLIC BLOOD PRESSURE: 142 MMHG | OXYGEN SATURATION: 96 % | WEIGHT: 154.8 LBS | HEART RATE: 80 BPM | HEIGHT: 65 IN | BODY MASS INDEX: 25.79 KG/M2

## 2021-06-30 DIAGNOSIS — L40.9 PSORIASIS: ICD-10-CM

## 2021-06-30 DIAGNOSIS — B36.0 TINEA VERSICOLOR: ICD-10-CM

## 2021-06-30 DIAGNOSIS — F33.1 MAJOR DEPRESSIVE DISORDER, RECURRENT EPISODE, MODERATE (H): ICD-10-CM

## 2021-06-30 DIAGNOSIS — Z00.00 ROUTINE GENERAL MEDICAL EXAMINATION AT A HEALTH CARE FACILITY: Primary | ICD-10-CM

## 2021-06-30 PROCEDURE — 86003 ALLG SPEC IGE CRUDE XTRC EA: CPT | Performed by: FAMILY MEDICINE

## 2021-06-30 PROCEDURE — 99396 PREV VISIT EST AGE 40-64: CPT | Performed by: FAMILY MEDICINE

## 2021-06-30 PROCEDURE — 86803 HEPATITIS C AB TEST: CPT | Performed by: FAMILY MEDICINE

## 2021-06-30 PROCEDURE — 82785 ASSAY OF IGE: CPT | Performed by: FAMILY MEDICINE

## 2021-06-30 PROCEDURE — 36415 COLL VENOUS BLD VENIPUNCTURE: CPT | Performed by: FAMILY MEDICINE

## 2021-06-30 PROCEDURE — 82306 VITAMIN D 25 HYDROXY: CPT | Performed by: FAMILY MEDICINE

## 2021-06-30 PROCEDURE — 99213 OFFICE O/P EST LOW 20 MIN: CPT | Mod: 25 | Performed by: FAMILY MEDICINE

## 2021-06-30 PROCEDURE — 86769 SARS-COV-2 COVID-19 ANTIBODY: CPT | Performed by: FAMILY MEDICINE

## 2021-06-30 PROCEDURE — 87389 HIV-1 AG W/HIV-1&-2 AB AG IA: CPT | Performed by: FAMILY MEDICINE

## 2021-06-30 PROCEDURE — 82947 ASSAY GLUCOSE BLOOD QUANT: CPT | Performed by: FAMILY MEDICINE

## 2021-06-30 RX ORDER — CLOTRIMAZOLE 1 %
CREAM (GRAM) TOPICAL 2 TIMES DAILY
Qty: 60 G | Refills: 1 | Status: SHIPPED | OUTPATIENT
Start: 2021-06-30 | End: 2021-07-21

## 2021-06-30 SDOH — HEALTH STABILITY: MENTAL HEALTH: HOW OFTEN DO YOU HAVE SIX OR MORE DRINKS ON ONE OCCASION?: LESS THAN MONTHLY

## 2021-06-30 SDOH — HEALTH STABILITY: PHYSICAL HEALTH: ON AVERAGE, HOW MANY MINUTES DO YOU ENGAGE IN EXERCISE AT THIS LEVEL?: 0 MIN

## 2021-06-30 SDOH — ECONOMIC STABILITY: INCOME INSECURITY: IN THE LAST 12 MONTHS, WAS THERE A TIME WHEN YOU WERE NOT ABLE TO PAY THE MORTGAGE OR RENT ON TIME?: NO

## 2021-06-30 SDOH — HEALTH STABILITY: PHYSICAL HEALTH: ON AVERAGE, HOW MANY DAYS PER WEEK DO YOU ENGAGE IN MODERATE TO STRENUOUS EXERCISE (LIKE A BRISK WALK)?: 0 DAYS

## 2021-06-30 ASSESSMENT — ENCOUNTER SYMPTOMS
HEMATURIA: 0
JOINT SWELLING: 0
COUGH: 0
WEAKNESS: 0
HEADACHES: 0
EYE PAIN: 0
HEARTBURN: 0
CONSTIPATION: 0
FEVER: 0
PARESTHESIAS: 0
DYSURIA: 0
SORE THROAT: 0
CHILLS: 0
MYALGIAS: 1
HEMATOCHEZIA: 0
FREQUENCY: 0
DIARRHEA: 0
NAUSEA: 0
ABDOMINAL PAIN: 0
DIZZINESS: 0
PALPITATIONS: 0
NERVOUS/ANXIOUS: 1
SHORTNESS OF BREATH: 0
BREAST MASS: 0
ARTHRALGIAS: 1

## 2021-06-30 ASSESSMENT — ANXIETY QUESTIONNAIRES
7. FEELING AFRAID AS IF SOMETHING AWFUL MIGHT HAPPEN: SEVERAL DAYS
5. BEING SO RESTLESS THAT IT IS HARD TO SIT STILL: SEVERAL DAYS
GAD7 TOTAL SCORE: 11
2. NOT BEING ABLE TO STOP OR CONTROL WORRYING: MORE THAN HALF THE DAYS
4. TROUBLE RELAXING: SEVERAL DAYS
1. FEELING NERVOUS, ANXIOUS, OR ON EDGE: NEARLY EVERY DAY
6. BECOMING EASILY ANNOYED OR IRRITABLE: SEVERAL DAYS
3. WORRYING TOO MUCH ABOUT DIFFERENT THINGS: MORE THAN HALF THE DAYS
7. FEELING AFRAID AS IF SOMETHING AWFUL MIGHT HAPPEN: SEVERAL DAYS
GAD7 TOTAL SCORE: 11
GAD7 TOTAL SCORE: 11

## 2021-06-30 ASSESSMENT — PATIENT HEALTH QUESTIONNAIRE - PHQ9
10. IF YOU CHECKED OFF ANY PROBLEMS, HOW DIFFICULT HAVE THESE PROBLEMS MADE IT FOR YOU TO DO YOUR WORK, TAKE CARE OF THINGS AT HOME, OR GET ALONG WITH OTHER PEOPLE: VERY DIFFICULT
SUM OF ALL RESPONSES TO PHQ QUESTIONS 1-9: 9
SUM OF ALL RESPONSES TO PHQ QUESTIONS 1-9: 9

## 2021-06-30 ASSESSMENT — MIFFLIN-ST. JEOR: SCORE: 1368.05

## 2021-06-30 NOTE — TELEPHONE ENCOUNTER
Please call fernando:  I forgot to ask couple of questions:  One: she says that she has someone in her family with breast or ovarian cancer, who is it?  Second: can we do nurse only BP or pharmacy BP check as her blood pressure was high in the clinic.  Len Matthews MD  [facility]  [phone]
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
## 2021-07-01 LAB
DEPRECATED CALCIDIOL+CALCIFEROL SERPL-MC: 25 UG/L (ref 20–75)
GLUCOSE SERPL-MCNC: 82 MG/DL (ref 70–99)
HCV AB SERPL QL IA: NONREACTIVE
HIV 1+2 AB+HIV1 P24 AG SERPL QL IA: NONREACTIVE

## 2021-07-01 ASSESSMENT — ANXIETY QUESTIONNAIRES: GAD7 TOTAL SCORE: 11

## 2021-07-01 ASSESSMENT — PATIENT HEALTH QUESTIONNAIRE - PHQ9: SUM OF ALL RESPONSES TO PHQ QUESTIONS 1-9: 9

## 2021-07-01 NOTE — TELEPHONE ENCOUNTER
Pt called back, advised that her Mother's sister had Ovarian Cancer. Pt advised that she will stop by Pharmacy to have BP checked. Routed to Provider / Vera Stark/EMT-B

## 2021-07-01 NOTE — TELEPHONE ENCOUNTER
Can we ask the FHS-7 questionnaire again? For breast cancer screening. And if she is positive, please let me know. (  I think she may have answered it wrong.

## 2021-07-01 NOTE — TELEPHONE ENCOUNTER
Routed to SB4, can you call pt to f/u per AA note?  Bee Mendez RN, BSN  Message handled by CLINIC NURSE.

## 2021-07-02 LAB
ALMOND IGE QN: <0.1 KU(A)/L
CASHEW NUT IGE QN: <0.1 KU(A)/L
CODFISH IGE QN: <0.1 KU(A)/L
COW MILK IGE QN: <0.1 KU(A)/L
EGG WHITE IGE QN: <0.1 KU(A)/L
HAZELNUT IGE QN: <0.1 KU(A)/L
IGE SERPL-ACNC: 19 KIU/L (ref 0–114)
PEANUT IGE QN: <0.1 KU(A)/L
SALMON IGE QN: <0.1 KU(A)/L
SARS-COV-2 AB PNL SERPL IA: NEGATIVE
SARS-COV-2 IGG SERPL IA-ACNC: NORMAL
SCALLOP IGE QN: <0.1 KU(A)/L
SESAME SEED IGE QN: <0.1 KU(A)/L
SHRIMP IGE QN: <0.1 KU(A)/L
SOYBEAN IGE QN: <0.1 KU(A)/L
TUNA IGE QN: <0.1 KU(A)/L
WALNUT IGE QN: <0.1 KU(A)/L
WHEAT IGE QN: <0.1 KU(A)/L

## 2021-07-22 ENCOUNTER — TELEPHONE (OUTPATIENT)
Dept: FAMILY MEDICINE | Facility: CLINIC | Age: 41
End: 2021-07-22

## 2021-07-22 NOTE — TELEPHONE ENCOUNTER
Pt called in stating during her last visit with PCP her BP was high. She went to [facility] yesterday and got a reading of 145/95 . Pt wants to know if she needs to see PCP again.  Routed to Provider / Vera Stark/EMT-B

## 2021-07-26 ENCOUNTER — MYC MEDICAL ADVICE (OUTPATIENT)
Dept: FAMILY MEDICINE | Facility: CLINIC | Age: 41
End: 2021-07-26

## 2021-07-26 NOTE — TELEPHONE ENCOUNTER
Call to patient: [phone] (home)     Left message asking patient to return the call.     Plan: Patient needs appointment with provider to discuss elevated blood pressure and anxiety.    Message handled by   Diya Armas RN

## 2021-08-03 ASSESSMENT — ANXIETY QUESTIONNAIRES
7. FEELING AFRAID AS IF SOMETHING AWFUL MIGHT HAPPEN: SEVERAL DAYS
GAD7 TOTAL SCORE: 15
GAD7 TOTAL SCORE: 15
3. WORRYING TOO MUCH ABOUT DIFFERENT THINGS: NEARLY EVERY DAY
1. FEELING NERVOUS, ANXIOUS, OR ON EDGE: NEARLY EVERY DAY
8. IF YOU CHECKED OFF ANY PROBLEMS, HOW DIFFICULT HAVE THESE MADE IT FOR YOU TO DO YOUR WORK, TAKE CARE OF THINGS AT HOME, OR GET ALONG WITH OTHER PEOPLE?: SOMEWHAT DIFFICULT
7. FEELING AFRAID AS IF SOMETHING AWFUL MIGHT HAPPEN: SEVERAL DAYS
2. NOT BEING ABLE TO STOP OR CONTROL WORRYING: NEARLY EVERY DAY
5. BEING SO RESTLESS THAT IT IS HARD TO SIT STILL: SEVERAL DAYS
4. TROUBLE RELAXING: MORE THAN HALF THE DAYS
6. BECOMING EASILY ANNOYED OR IRRITABLE: MORE THAN HALF THE DAYS
GAD7 TOTAL SCORE: 15

## 2021-08-03 ASSESSMENT — PATIENT HEALTH QUESTIONNAIRE - PHQ9
SUM OF ALL RESPONSES TO PHQ QUESTIONS 1-9: 10
10. IF YOU CHECKED OFF ANY PROBLEMS, HOW DIFFICULT HAVE THESE PROBLEMS MADE IT FOR YOU TO DO YOUR WORK, TAKE CARE OF THINGS AT HOME, OR GET ALONG WITH OTHER PEOPLE: SOMEWHAT DIFFICULT
SUM OF ALL RESPONSES TO PHQ QUESTIONS 1-9: 10

## 2021-08-04 ENCOUNTER — OFFICE VISIT (OUTPATIENT)
Dept: FAMILY MEDICINE | Facility: CLINIC | Age: 41
End: 2021-08-04
Payer: COMMERCIAL

## 2021-08-04 VITALS
OXYGEN SATURATION: 99 % | BODY MASS INDEX: 25.13 KG/M2 | SYSTOLIC BLOOD PRESSURE: 112 MMHG | HEART RATE: 76 BPM | TEMPERATURE: 97.9 F | WEIGHT: 151 LBS | DIASTOLIC BLOOD PRESSURE: 82 MMHG

## 2021-08-04 DIAGNOSIS — Z13.220 LIPID SCREENING: Primary | ICD-10-CM

## 2021-08-04 DIAGNOSIS — F41.1 GAD (GENERALIZED ANXIETY DISORDER): ICD-10-CM

## 2021-08-04 PROCEDURE — 80061 LIPID PANEL: CPT | Performed by: FAMILY MEDICINE

## 2021-08-04 PROCEDURE — 36415 COLL VENOUS BLD VENIPUNCTURE: CPT | Performed by: FAMILY MEDICINE

## 2021-08-04 PROCEDURE — 99214 OFFICE O/P EST MOD 30 MIN: CPT | Performed by: FAMILY MEDICINE

## 2021-08-04 RX ORDER — HYDROXYZINE HYDROCHLORIDE 25 MG/1
25-50 TABLET, FILM COATED ORAL EVERY 8 HOURS PRN
Qty: 30 TABLET | Refills: 0 | Status: SHIPPED | OUTPATIENT
Start: 2021-08-04

## 2021-08-04 ASSESSMENT — PATIENT HEALTH QUESTIONNAIRE - PHQ9: SUM OF ALL RESPONSES TO PHQ QUESTIONS 1-9: 10

## 2021-08-04 ASSESSMENT — ANXIETY QUESTIONNAIRES: GAD7 TOTAL SCORE: 15

## 2021-08-04 NOTE — PROGRESS NOTES
Assessment & Plan     Lipid screening    - Lipid panel reflex to direct LDL Fasting    MERI (generalized anxiety disorder)  Discussed options for therapy, will start on   - hydrOXYzine (ATARAX) 25 MG tablet; Take 1-2 tablets (25-50 mg) by mouth every 8 hours as needed for anxiety  Use only during panic attacks, meanwhile, start on psychotherapy (appointment tomorrow), and reach out if no improvement in 2 to 3 montsh.  Reassured today about her blood pressure, Today I counseled the patient about diet, regular exercise and weight loss planning.                   Return in about 3 months (around 11/4/2021).    Len Matthews MD  [facility]    Gianni Sin is a 41 year old who presents for the following health issues     History of Present Illness       Mental Health Follow-up:  Patient presents to follow-up on Anxiety.    Patient's anxiety since last visit has been:  Worse  The patient is having other symptoms associated with anxiety.  Any significant life events: health concerns  Patient is feeling anxious or having panic attacks.  Patient has no concerns about alcohol or drug use.     Social History  Tobacco Use    Smoking status: Current Every Day Smoker      Packs/day: 0.50      Types: Cigarettes    Smokeless tobacco: Never Used  Alcohol use: Yes    Comment: occ  Drug use: No      Today's PHQ-9         PHQ-9 Total Score:     (P) 10   PHQ-9 Q9 Thoughts of better off dead/self-harm past 2 weeks :   (P) Not at all   Thoughts of suicide or self harm:      Self-harm Plan:        Self-harm Action:          Safety concerns for self or others:           Hypertension: She presents for follow up of hypertension.  She does not check blood pressure  regularly outside of the clinic. Outside blood pressures have been over 140/90. She does not follow a low salt diet. She consumes 0 sweetened beverage(s) daily.   She is taking medications regularly.     Pt said that she has been feeling anxious,  she has 3 panic attacks that she has to call her mom to calm her, precipitated by checking her blood pressure and being high, and sometimes when she thinks about death and dying.  Pt is still trying to quit smoking and cut down to 2 to 3 cigarettes a day, and she is wondering if cutting on smoking will improve her symptoms.  Pt symptoms about 3 to 4 months ago, when she noticed that she is more anxious about going out in the street, and worry about enjoying social situation due to covid exposure.    BP has been normal when she is calm.        Review of Systems   CONSTITUTIONAL: NEGATIVE for fever, chills, change in weight  RESP: NEGATIVE for significant cough or SOB  CV: NEGATIVE for chest pain, palpitations or peripheral edema      Objective    BP (!) 150/88   Pulse 76   Temp 97.9  F (36.6  C) (Oral)   Wt 68.5 kg (151 lb)   SpO2 99%   BMI 25.13 kg/m    Body mass index is 25.13 kg/m .  Physical Exam   GENERAL: healthy, alert and no distress  NECK: no adenopathy, no asymmetry, masses, or scars and thyroid normal to palpation  RESP: lungs clear to auscultation - no rales, rhonchi or wheezes  CV: regular rate and rhythm, normal S1 S2, no S3 or S4, no murmur, click or rub, no peripheral edema and peripheral pulses strong  ABDOMEN: soft, nontender, no hepatosplenomegaly, no masses and bowel sounds normal  MS: no gross musculoskeletal defects noted, no edema                Answers for HPI/ROS submitted by the patient on 8/3/2021  If you checked off any problems, how difficult have these problems made it for you to do your work, take care of things at home, or get along with other people?: Somewhat difficult  PHQ9 TOTAL SCORE: 10  MERI 7 TOTAL SCORE: 15    Conflicting answers have been found for some questions. Please document the patient's answers manually.

## 2021-08-05 LAB
CHOLEST SERPL-MCNC: 219 MG/DL
FASTING STATUS PATIENT QL REPORTED: YES
HDLC SERPL-MCNC: 74 MG/DL
LDLC SERPL CALC-MCNC: 138 MG/DL
NONHDLC SERPL-MCNC: 145 MG/DL
TRIGL SERPL-MCNC: 35 MG/DL

## 2021-08-25 ENCOUNTER — HOSPITAL ENCOUNTER (EMERGENCY)
Facility: CLINIC | Age: 41
Discharge: HOME | End: 2021-08-25
Attending: EMERGENCY MEDICINE | Admitting: EMERGENCY MEDICINE
Payer: COMMERCIAL

## 2021-08-25 VITALS
OXYGEN SATURATION: 99 % | SYSTOLIC BLOOD PRESSURE: 146 MMHG | TEMPERATURE: 98.5 F | RESPIRATION RATE: 16 BRPM | BODY MASS INDEX: 25.33 KG/M2 | WEIGHT: 152 LBS | HEIGHT: 65 IN | DIASTOLIC BLOOD PRESSURE: 90 MMHG | HEART RATE: 96 BPM

## 2021-08-25 DIAGNOSIS — R94.31 PROLONGED QT INTERVAL: ICD-10-CM

## 2021-08-25 DIAGNOSIS — R00.2 PALPITATIONS: ICD-10-CM

## 2021-08-25 LAB
ANION GAP SERPL CALCULATED.3IONS-SCNC: 6 MMOL/L (ref 3–14)
ATRIAL RATE - MUSE: 98 BPM
BASOPHILS # BLD AUTO: 0 10E3/UL (ref 0–0.2)
BASOPHILS NFR BLD AUTO: 0 %
BUN SERPL-MCNC: 14 MG/DL (ref 7–30)
CALCIUM SERPL-MCNC: 9.3 MG/DL (ref 8.5–10.1)
CHLORIDE BLD-SCNC: 108 MMOL/L (ref 94–109)
CO2 SERPL-SCNC: 24 MMOL/L (ref 20–32)
CREAT SERPL-MCNC: 0.65 MG/DL (ref 0.52–1.04)
DIASTOLIC BLOOD PRESSURE - MUSE: NORMAL MMHG
EOSINOPHIL # BLD AUTO: 0.1 10E3/UL (ref 0–0.7)
EOSINOPHIL NFR BLD AUTO: 1 %
ERYTHROCYTE [DISTWIDTH] IN BLOOD BY AUTOMATED COUNT: 11 % (ref 10–15)
GFR SERPL CREATININE-BSD FRML MDRD: >90 ML/MIN/1.73M2
GLUCOSE BLD-MCNC: 86 MG/DL (ref 70–99)
HCT VFR BLD AUTO: 39.8 % (ref 35–47)
HGB BLD-MCNC: 13.8 G/DL (ref 11.7–15.7)
IMM GRANULOCYTES # BLD: 0 10E3/UL
IMM GRANULOCYTES NFR BLD: 0 %
INTERPRETATION ECG - MUSE: NORMAL
LYMPHOCYTES # BLD AUTO: 1.8 10E3/UL (ref 0.8–5.3)
LYMPHOCYTES NFR BLD AUTO: 27 %
MCH RBC QN AUTO: 31.7 PG (ref 26.5–33)
MCHC RBC AUTO-ENTMCNC: 34.7 G/DL (ref 31.5–36.5)
MCV RBC AUTO: 91 FL (ref 78–100)
MONOCYTES # BLD AUTO: 0.5 10E3/UL (ref 0–1.3)
MONOCYTES NFR BLD AUTO: 7 %
NEUTROPHILS # BLD AUTO: 4.4 10E3/UL (ref 1.6–8.3)
NEUTROPHILS NFR BLD AUTO: 65 %
NRBC # BLD AUTO: 0 10E3/UL
NRBC BLD AUTO-RTO: 0 /100
P AXIS - MUSE: 65 DEGREES
PLATELET # BLD AUTO: 307 10E3/UL (ref 150–450)
POTASSIUM BLD-SCNC: 3.7 MMOL/L (ref 3.4–5.3)
PR INTERVAL - MUSE: 110 MS
QRS DURATION - MUSE: 92 MS
QT - MUSE: 428 MS
QTC - MUSE: 546 MS
R AXIS - MUSE: 62 DEGREES
RBC # BLD AUTO: 4.36 10E6/UL (ref 3.8–5.2)
SODIUM SERPL-SCNC: 138 MMOL/L (ref 133–144)
SYSTOLIC BLOOD PRESSURE - MUSE: NORMAL MMHG
T AXIS - MUSE: 50 DEGREES
VENTRICULAR RATE- MUSE: 98 BPM
WBC # BLD AUTO: 6.8 10E3/UL (ref 4–11)

## 2021-08-25 PROCEDURE — 93005 ELECTROCARDIOGRAM TRACING: CPT

## 2021-08-25 PROCEDURE — 85025 COMPLETE CBC W/AUTO DIFF WBC: CPT | Performed by: EMERGENCY MEDICINE

## 2021-08-25 PROCEDURE — 99284 EMERGENCY DEPT VISIT MOD MDM: CPT | Mod: 25

## 2021-08-25 PROCEDURE — 36415 COLL VENOUS BLD VENIPUNCTURE: CPT | Performed by: EMERGENCY MEDICINE

## 2021-08-25 PROCEDURE — 80048 BASIC METABOLIC PNL TOTAL CA: CPT | Performed by: EMERGENCY MEDICINE

## 2021-08-25 ASSESSMENT — ENCOUNTER SYMPTOMS
NERVOUS/ANXIOUS: 1
FREQUENCY: 1
DYSURIA: 0
HEMATURIA: 0

## 2021-08-25 ASSESSMENT — MIFFLIN-ST. JEOR: SCORE: 1355.35

## 2021-08-25 NOTE — LETTER
August 25, 2021      To Whom It May Concern:      January PETERSON Chrissie was seen in our Emergency Department today, 08/25/21.  I expect her condition to improve over the next 2 days.  She may return to work/school when improved.    Sincerely,

## 2021-08-27 ENCOUNTER — OFFICE VISIT (OUTPATIENT)
Dept: CARDIOLOGY | Facility: CLINIC | Age: 41
End: 2021-08-27
Attending: EMERGENCY MEDICINE
Payer: COMMERCIAL

## 2021-08-27 VITALS
SYSTOLIC BLOOD PRESSURE: 122 MMHG | HEIGHT: 65 IN | BODY MASS INDEX: 24.32 KG/M2 | HEART RATE: 88 BPM | DIASTOLIC BLOOD PRESSURE: 88 MMHG | WEIGHT: 146 LBS

## 2021-08-27 DIAGNOSIS — R00.2 PALPITATIONS: ICD-10-CM

## 2021-08-27 DIAGNOSIS — R94.31 PROLONGED QT INTERVAL: ICD-10-CM

## 2021-08-27 PROCEDURE — 99204 OFFICE O/P NEW MOD 45 MIN: CPT | Performed by: INTERNAL MEDICINE

## 2021-08-27 ASSESSMENT — MIFFLIN-ST. JEOR: SCORE: 1328.13

## 2021-08-27 NOTE — PROGRESS NOTES
Service Date: 08/27/2021    HISTORY OF PRESENT ILLNESS:  I saw Ms. Dickens for evaluation of long QT.  She is a 41-year-old white female who is known to have anxiety.  She presented to the ER with palpitations and anxiety on 08/24/2021.  The EKG reported long QT.  For that reason, she was sent to Cardiology for consultation.    Symptomatically, her palpitation is always associated with anxiety.  She has Apple watch that has been monitoring her heart rate.  There has been no sudden heart rate change from her watch.  She has no history of syncope or near syncope.    Her past medical condition is remarkable for generalized anxiety disorder.  She is currently scheduled to see psychology and psychiatry.  She has GI reflux and depression.    The family history is unremarkable for sudden cardiac death or cardiac arrhythmia.    The patient does not take cardiac medications at the present time.      She is a smoker who does about 3-5 cigarettes a day.    PHYSICAL EXAMINATION:    VITAL SIGNS:  Blood pressure was 122/88, heart rate 80 beats per minute, body weight 146 pounds.  HEENT:  The eyes and ENT were unremarkable.  LUNGS:  Clear.  CARDIAC:  Rhythm was regular and heart sounds were normal with no murmur.  ABDOMEN:  No obesity.  EXTREMITIES:  There is no pedal edema.    EKG from 08/24/2021 showed sinus rhythm with heart rate 98 beats per minute.  She had some T-wave inversion on the inferior and lateral leads.  The computer-generated QT interval was measured 428 milliseconds, which is likely overestimated by the computer.  My manual measurement of the QT interval is less than 400 milliseconds.    ASSESSMENT AND RECOMMENDATIONS:  Ms. Dickens is a 41-year-old white female who had anxiety attacks frequently.  The EKG showed a negative T waves.  Because of that, the QT measurement by the computer over estimated the QT interval.  There is no evidence of prolonged QT.  The patient is asked to quit smoking.  I do not recommend  further cardiac testing.  Her current heart condition does not pose contraindication for use of any psychiatric medications in the near future.  If there is any concern of a psychiatric medication or QT or EKG, she can always have an EKG and send a copy to us for further review.  Otherwise, she does not need routine Cardiology followup.    cc:   Len Matthews MD   [facility]  [address] [place] [address]     Suzy Marrero MD        D: 2021   T: 2021   MT: JOSE ELIAS    Name:     CHANDA MONTOYA  MRN:      [id number]        Account:      [id number]   :      1980           Service Date: 2021       Document: [id number]
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
## 2021-10-24 ENCOUNTER — HEALTH MAINTENANCE LETTER (OUTPATIENT)
Age: 41
End: 2021-10-24

## 2021-10-27 NOTE — PROGRESS NOTES
Pre-Visit Planning   Next 5 appointments (look out 90 days)    Oct 28, 2021  9:10 AM  (Arrive by 8:50 AM)  Office Visit with Octavio Jeffries MD  [facility] ([facility] ) [address] [facility] [address] [phone]        Appointment Notes for this encounter:   reviewed JQ // Burnt mouth syndrome, wants second opinion about it    Questionnaires Reviewed/Assigned  No additional questionnaires are needed    Patient preferred phone number: [phone]    Unable to reach. Left voicemail. Advised patient to call clinic back at [phone].

## 2021-10-28 ENCOUNTER — OFFICE VISIT (OUTPATIENT)
Dept: FAMILY MEDICINE | Facility: CLINIC | Age: 41
End: 2021-10-28
Attending: FAMILY MEDICINE
Payer: COMMERCIAL

## 2021-10-28 ENCOUNTER — OFFICE VISIT (OUTPATIENT)
Dept: FAMILY MEDICINE | Facility: CLINIC | Age: 41
End: 2021-10-28
Payer: COMMERCIAL

## 2021-10-28 VITALS
HEART RATE: 74 BPM | SYSTOLIC BLOOD PRESSURE: 128 MMHG | OXYGEN SATURATION: 100 % | RESPIRATION RATE: 18 BRPM | DIASTOLIC BLOOD PRESSURE: 80 MMHG | HEIGHT: 65 IN | WEIGHT: 145.5 LBS | BODY MASS INDEX: 24.24 KG/M2 | TEMPERATURE: 98.4 F

## 2021-10-28 VITALS — SYSTOLIC BLOOD PRESSURE: 128 MMHG | DIASTOLIC BLOOD PRESSURE: 80 MMHG

## 2021-10-28 DIAGNOSIS — L60.1 ONYCHOLYSIS: ICD-10-CM

## 2021-10-28 DIAGNOSIS — M25.541 ARTHRALGIA OF RIGHT HAND: Primary | ICD-10-CM

## 2021-10-28 DIAGNOSIS — R13.10 DYSPHAGIA, UNSPECIFIED TYPE: ICD-10-CM

## 2021-10-28 DIAGNOSIS — L29.9 LOCALIZED PRURITUS: ICD-10-CM

## 2021-10-28 DIAGNOSIS — L40.9 PSORIASIS: ICD-10-CM

## 2021-10-28 DIAGNOSIS — R94.31 PROLONGED QT INTERVAL: ICD-10-CM

## 2021-10-28 DIAGNOSIS — R20.8 BURNING SENSATION OF MOUTH: Primary | ICD-10-CM

## 2021-10-28 LAB — VIT B12 SERPL-MCNC: 581 PG/ML (ref 193–986)

## 2021-10-28 PROCEDURE — 86235 NUCLEAR ANTIGEN ANTIBODY: CPT | Performed by: FAMILY MEDICINE

## 2021-10-28 PROCEDURE — 36415 COLL VENOUS BLD VENIPUNCTURE: CPT | Performed by: FAMILY MEDICINE

## 2021-10-28 PROCEDURE — 99214 OFFICE O/P EST MOD 30 MIN: CPT | Performed by: PHYSICIAN ASSISTANT

## 2021-10-28 PROCEDURE — 80061 LIPID PANEL: CPT | Performed by: FAMILY MEDICINE

## 2021-10-28 PROCEDURE — 93000 ELECTROCARDIOGRAM COMPLETE: CPT | Performed by: FAMILY MEDICINE

## 2021-10-28 PROCEDURE — 86235 NUCLEAR ANTIGEN ANTIBODY: CPT | Mod: 59 | Performed by: FAMILY MEDICINE

## 2021-10-28 PROCEDURE — 82607 VITAMIN B-12: CPT | Performed by: FAMILY MEDICINE

## 2021-10-28 PROCEDURE — 82746 ASSAY OF FOLIC ACID SERUM: CPT | Performed by: FAMILY MEDICINE

## 2021-10-28 PROCEDURE — 82728 ASSAY OF FERRITIN: CPT | Performed by: FAMILY MEDICINE

## 2021-10-28 PROCEDURE — 83516 IMMUNOASSAY NONANTIBODY: CPT | Mod: 59 | Performed by: FAMILY MEDICINE

## 2021-10-28 PROCEDURE — 84443 ASSAY THYROID STIM HORMONE: CPT | Performed by: FAMILY MEDICINE

## 2021-10-28 PROCEDURE — 99214 OFFICE O/P EST MOD 30 MIN: CPT | Performed by: FAMILY MEDICINE

## 2021-10-28 RX ORDER — CLOBETASOL PROPIONATE 0.5 MG/ML
SOLUTION TOPICAL 2 TIMES DAILY
Qty: 50 ML | Refills: 3 | Status: SHIPPED | OUTPATIENT
Start: 2021-10-28 | End: 2023-02-13

## 2021-10-28 RX ORDER — NYSTATIN 100000/ML
SUSPENSION, ORAL (FINAL DOSE FORM) ORAL
Qty: 400 ML | Refills: 0 | Status: SHIPPED | OUTPATIENT
Start: 2021-10-28 | End: 2023-09-01

## 2021-10-28 ASSESSMENT — MIFFLIN-ST. JEOR: SCORE: 1325.86

## 2021-10-28 NOTE — PROGRESS NOTES
Assessment & Plan     Burning sensation of mouth  -Trial nystatin for possible fungal cause. Check labs today for other possible causes. Multiple family members with Sjorgens. Currently smoking 3 cigarettes a day.   - Otolaryngology Referral; Future  - SSA Ro YAMINI Antibody IgG  - SSB La YAMINI Antibody IgG  - TSH with free T4 reflex  - Ferritin  - Vitamin B12  - Tissue transglutaminase emiliano IgA and IgG  - Folate  - nystatin (MYCOSTATIN) 449013 UNIT/ML suspension; Take 5 mL 4 times daily    Lipid screen  - Lipid panel reflex to direct LDL Fasting    Dysphagia, unspecified type  -Left side abnormal sensation with swallowing, referral to ENT.   - Otolaryngology Referral; Future    Prolonged QT interval  - ER visit and computer read a prolonged QTc, cardiology reports this is an overestimate. Will re-do EKG today due to patient on SSRI  - EKG 12-lead complete w/read - Clinics        {Provider  Link to [facility] Help Grid :[id number]}         No follow-ups on file.    Octavio Jeffries MD  [facility]    Gianni Sin is a 41 year old female who presents to the clinic for evaluation of mouth symptoms. The symptoms started about 6 months ago. Her symptoms are dry tongue, metallic taste, increased saliva production, increase thirst and numb tongue. She also reports an abnormal left sided throat sensation with swallowing. She has not tried anything OTC for symptoms. She denies recent illness, dry eyes, and difficulty swallowing.     History of Present Illness       Hyperlipidemia:  She presents for follow up of hyperlipidemia.  She is not taking medication to lower cholesterol. She is not having myalgia or other side effects to statin medications.    Hypertension: She presents for follow up of hypertension.  She does not check blood pressure  regularly outside of the clinic. Outside blood pressures have been over 140/90. She does not follow a low salt diet.     She eats 2-3 servings of fruits and vegetables  "daily.She consumes 0 sweetened beverage(s) daily.She exercises with enough effort to increase her heart rate 9 or less minutes per day.  She exercises with enough effort to increase her heart rate 3 or less days per week.   She is taking medications regularly.       Review of Systems   CONSTITUTIONAL: NEGATIVE for fever, chills, change in weight  ENT/MOUTH: POSITIVE for dry tongue, increased saliva production, abnormal throat sensation with swallowing      Objective    /80 (BP Location: Right arm, Patient Position: Sitting, Cuff Size: Adult Regular)   Pulse 74   Temp 98.4  F (36.9  C) (Oral)   Resp 18   Ht 1.651 m (5' 5\")   Wt 66 kg (145 lb 8 oz)   SpO2 100%   BMI 24.21 kg/m    Body mass index is 24.21 kg/m .  Physical Exam   GENERAL: healthy, alert and no distress  HENT: Lips are dry. Oral mucosa is pink and moist. Tongue with no fissure. No lesions or masses present. Pharynx is pink and moist. Teeth intact. Tympanic membranes are pearly grey with no effusion. Ear canals without erythema or drainage. Conjunctiva pink and moist. PERRL intact.   NECK: no adenopathy, no asymmetry, masses, or scars and thyroid normal to palpation  NEURO: cranial nerve VII, IX, X, and XII intact.         Physician Attestation   I, Octavio Jeffries, was present with the medical/[employer] student who participated in the service and in the documentation of the note.  I have verified the history and personally performed the physical exam and medical decision making.  I agree with the assessment and plan of care as documented in the note.      Items personally reviewed: vitals, labs and exam and agree with the interpretation documented in the note.    Octavio Jeffries MD        "

## 2021-10-28 NOTE — PATIENT INSTRUCTIONS
Proper skin care from [facility]:    -Eliminate harsh soaps as they strip the natural oils from the skin, often resulting in dry itchy skin ( i.e. Dial, Zest, [place])  -Use mild soaps such as Cetaphil or Dove Sensitive Skin in the shower. You do not need to use soap on arms, legs, and trunk every time you shower unless visibly soiled.   -Avoid hot or cold showers.  -After showering, lightly dry off and apply moisturizing within 2-3 minutes. This will help trap moisture in the skin.   -Aggressive use of a moisturizer at least 1-2 times a day to the entire body (including -Vanicream, Cetaphil, Aquaphor or Cerave) and moisturize hands after every washing.  -We recommend using moisturizers that come in a tub that needs to be scooped out, not a pump. This has more of an oil base. It will hold moisture in your skin much better than a water base moisturizer. The above recommended are non-pore clogging.      Wear a sunscreen with at least SPF 30 on your face, ears, neck and V of the chest daily. Wear sunscreen on other areas of the body if those areas are exposed to the sun throughout the day. Sunscreens can contain physical and/or chemical blockers. Physical blockers are less likely to clog pores, these include zinc oxide and titanium dioxide. Reapply every two hour and after swimming.     Sunscreen examples: https://www.ewg.org/sunscreen/    UV radiation  UVA radiation remains constant throughout the day and throughout the year. It is a longer wavelength than UVB and therefore penetrates deeper into the skin leading to immediate and delayed tanning, photoaging, and skin cancer. 70-80% of UVA and UVB radiation occurs between the hours of 10am-2pm.  UVB radiation  UVB radiation causes the most harmful effects and is more significant during the summer months. However, snow and ice can reflect UVB radiation leading to skin damage during the winter months as well. UVB radiation is responsible for tanning,  burning, inflammation, delayed erythema (pinkness), pigmentation (brown spots), and skin cancer.     I recommend self monthly full body exams and yearly full body exams with a dermatology provider. If you develop a new or changing lesion please follow up for examination. Most skin cancers are pink and scaly or pink and pearly. However, we do see blue/brown/black skin cancers.  Consider the ABCDEs of melanoma when giving yourself your monthly full body exam ( don't forget the groin, buttocks, feet, toes, etc). A-asymmetry, B-borders, C-color, D-diameter, E-elevation or evolving. If you see any of these changes please follow up in clinic. If you cannot see your back I recommend purchasing a hand held mirror to use with a larger wall mirror.          Consider Neutrogena T-Sal  Apply a thin layer of clobetasol to affected area on toes and scalp 2x a day for 4-6 weeks. Tapering with improvement. Do not use on face or body folds.  Discussed side effects of topical steroids including but not limited to atrophy (skin thinning), striae (stretch marks) telangiectasias, steroid acne, and others. Do not apply to normal skin. Do not apply to discolored skin that does not have rash present. Educated patient on post inflammatory hyperpigmentation.

## 2021-10-28 NOTE — PROGRESS NOTES
[facility] Dermatology Note  Encounter Date: Oct 28, 2021  Office Visit     Dermatology Problem List:  1. Psoriasis  - Previous rx: TAC, Diprolene lotion.   - Current rx: clobetasol 0.05% solution  ____________________________________________    Assessment & Plan:    1)  Psoriasis, localized pruritis, onycholysis, arthralgia    Discussed topical medications, IL TAC, oral medications including Methotrexate and Otezla, NB-UVB, and biologic medications. Patient is not interested in systemic treatments at this time.   -start Neutrogena T sal or T gel shampoo  - start clobetasol 0.05% solution to affected areas on scalp, body BID for 2-4 weeks (just not face or body folds). Stop Diprolene 0.05% lotion. Begin clobetasol to nails-may need systemic treatment for improvement  - Due to joint pains and stiffness, referred to rheumatology for psoriatic arthritis work up.     Procedures Performed:   None    Follow-up: 1 year or pending rheumatology input    Staff and Scribe:     Scribe Disclosure:  I, Chela Malagon, am serving as a scribe to document services personally performed by Marcia Pro PA-C based on data collection and the provider's statements to me.     Provider Disclosure:  The documentation recorded by the scribe accurately reflects the services I personally performed and the decisions made by me.      ____________________________________________    CC: Psoriasis (scalp and leg)    HPI:  Ms. January Dickens is a(n) 41 year old female who presents today as a new patient for psoriasis. The patient was referred to dermatology by Dr. Matthwes for severe psoriasis on the scalp with toenail manifestation.     The patient is here today for psoriasis. Patient states this has been present for her whole life.  Patient reports the following symptoms: Rash on back of neck and scalp is the worst. Very mild on rest of skin. Flares some years, other years it is calm.  Patient reports the following  previous treatments: TAC cream and Diprolene drops BID with some improvement. These creams use to help more, although are still helpful for bothersome patches.  Patient reports the following modifying factors: none.  Associated symptoms: Joint pain in right knee (for 10 years now) and right fingers (for 2 years now). Also reports stiffness in the morning. Uplifting toenails, for years, that can be painful.  She has recently tried dietary changes (cut out red meat), but feels it is too early to know if this has helped. Patient has no other skin complaints today. Also, she is currently being tested for Sjogren's as her mother and sister both have this. Remainder of the HPI, Meds, PMH, Allergies, FH, and SH was reviewed in chart.        Labs Reviewed:  N/A    Physical Exam:  Vitals: /80   LMP 10/11/2021 (Approximate)   Eyes: Conjunctivae/lids: Normal   ENT: Lips:  Normal  MSK: Normal  Cardiovascular: Peripheral edema none  Pulm: Breathing Normal  Neuro/Psych: Orientation: A/O x 3 Normal; Mood/Affect: Normal, NAD, WDWN  Pt accompanied by: self  Following areas examined: Scalp, toenails, fingernails, hands, feet, forearms, legs  Alfaro skin type: ii  Findings:  - Pink inflammed white thickened plaques on occipital scalp.   -lifting of distal nail on left foot  toenail and right hand fingernail  - No other lesions of concern on areas examined.     Medications:  Current Outpatient Medications   Medication     augmented betamethasone dipropionate (DIPROLENE) 0.05 % external lotion     hydrOXYzine (ATARAX) 25 MG tablet     nicotine (NICODERM CQ) 14 MG/24HR 24 hr patch     nystatin (MYCOSTATIN) 031802 UNIT/ML suspension     sertraline (ZOLOFT) 50 MG tablet     triamcinolone (KENALOG) 0.1 % external cream     No current facility-administered medications for this visit.      Past Medical History:   Patient Active Problem List   Diagnosis     Tobacco use disorder     Major depressive disorder, recurrent episode,  moderate (H)     Gastroesophageal reflux disease without esophagitis     MERI (generalized anxiety disorder)     Right leg numbness     Past Medical History:   Diagnosis Date     MERI (generalized anxiety disorder) 8/8/2017     Gastroesophageal reflux disease without esophagitis 4/7/2017     Infectious mononucleosis      Major depressive disorder, recurrent episode, moderate (H) 4/7/2017     Other psoriasis      Right leg numbness 3/23/2018        CC [place]  [address] [place] [address] on close of this encounter.

## 2021-10-29 LAB
CHOLEST SERPL-MCNC: 229 MG/DL
ENA SS-A AB SER IA-ACNC: <0.5 U/ML
ENA SS-A AB SER IA-ACNC: NEGATIVE
ENA SS-B IGG SER IA-ACNC: <0.6 U/ML
ENA SS-B IGG SER IA-ACNC: NEGATIVE
FASTING STATUS PATIENT QL REPORTED: ABNORMAL
FERRITIN SERPL-MCNC: 7 NG/ML (ref 12–150)
FOLATE SERPL-MCNC: 12.8 NG/ML
HDLC SERPL-MCNC: 85 MG/DL
LDLC SERPL CALC-MCNC: 136 MG/DL
NONHDLC SERPL-MCNC: 144 MG/DL
TRIGL SERPL-MCNC: 38 MG/DL
TSH SERPL DL<=0.005 MIU/L-ACNC: 0.59 MU/L (ref 0.4–4)
TTG IGA SER-ACNC: 0.6 U/ML
TTG IGG SER-ACNC: 0.7 U/ML

## 2021-11-03 ENCOUNTER — TRANSFERRED RECORDS (OUTPATIENT)
Dept: HEALTH INFORMATION MANAGEMENT | Facility: CLINIC | Age: 41
End: 2021-11-03
Payer: COMMERCIAL

## 2022-01-07 ENCOUNTER — TELEPHONE (OUTPATIENT)
Dept: FAMILY MEDICINE | Facility: CLINIC | Age: 42
End: 2022-01-07
Payer: COMMERCIAL

## 2022-01-07 NOTE — TELEPHONE ENCOUNTER
Patient Quality Outreach    Patient is due for the following:   Depression  -  PHQ-9 Needed    NEXT STEPS:   complete PHQ-9    Type of outreach:    Sent [employer] message.      Questions for provider review:    None     Jasmyn Bruner, CMA

## 2022-03-03 ENCOUNTER — TELEPHONE (OUTPATIENT)
Dept: FAMILY MEDICINE | Facility: CLINIC | Age: 42
End: 2022-03-03
Payer: COMMERCIAL

## 2022-03-03 NOTE — TELEPHONE ENCOUNTER
Patient Quality Outreach    Patient is due for the following:   Depression  -  PHQ-9 Needed    NEXT STEPS:   complete PHQ-9    Type of outreach:    Sent [employer] message.      Questions for provider review:    None     Jasmyn Bruner, CMA

## 2022-07-31 ENCOUNTER — HEALTH MAINTENANCE LETTER (OUTPATIENT)
Age: 42
End: 2022-07-31

## 2022-10-15 ENCOUNTER — HEALTH MAINTENANCE LETTER (OUTPATIENT)
Age: 42
End: 2022-10-15

## 2023-02-13 DIAGNOSIS — L40.9 PSORIASIS: ICD-10-CM

## 2023-02-13 RX ORDER — CLOBETASOL PROPIONATE 0.5 MG/ML
SOLUTION TOPICAL 2 TIMES DAILY
Qty: 50 ML | Refills: 3 | Status: SHIPPED | OUTPATIENT
Start: 2023-02-13 | End: 2023-10-16

## 2023-02-13 NOTE — TELEPHONE ENCOUNTER
Clobetasol 0.05% solution  Last Written Prescription Date:  10/28/21  Last Fill Quantity: 50 ml,  # refills: 3   Last office visit: 10/28/2021 with prescribing provider:  Marcia Pro   Future Office Visit:      Routing refill request to provider for review/approval because:  Drug not on the [facility] refill protocol   Patient needs to be seen because it has been more than 1 year since last office visit.    Melony SHAY RN  [facility] [place]  [phone]

## 2023-08-20 ENCOUNTER — HEALTH MAINTENANCE LETTER (OUTPATIENT)
Age: 43
End: 2023-08-20

## 2023-09-01 ENCOUNTER — OFFICE VISIT (OUTPATIENT)
Dept: FAMILY MEDICINE | Facility: CLINIC | Age: 43
End: 2023-09-01
Payer: COMMERCIAL

## 2023-09-01 VITALS
SYSTOLIC BLOOD PRESSURE: 124 MMHG | WEIGHT: 172.8 LBS | HEIGHT: 65 IN | BODY MASS INDEX: 28.79 KG/M2 | TEMPERATURE: 98.2 F | HEART RATE: 84 BPM | DIASTOLIC BLOOD PRESSURE: 76 MMHG | OXYGEN SATURATION: 97 % | RESPIRATION RATE: 16 BRPM

## 2023-09-01 DIAGNOSIS — F41.1 GAD (GENERALIZED ANXIETY DISORDER): ICD-10-CM

## 2023-09-01 DIAGNOSIS — Z72.820 POOR SLEEP: ICD-10-CM

## 2023-09-01 DIAGNOSIS — M53.3 SACROILIAC JOINT PAIN: ICD-10-CM

## 2023-09-01 DIAGNOSIS — Z13.6 SCREENING FOR CARDIOVASCULAR CONDITION: ICD-10-CM

## 2023-09-01 DIAGNOSIS — R68.2 DRY MOUTH: ICD-10-CM

## 2023-09-01 DIAGNOSIS — F17.200 TOBACCO USE DISORDER: ICD-10-CM

## 2023-09-01 DIAGNOSIS — Z13.1 SCREENING FOR DIABETES MELLITUS: ICD-10-CM

## 2023-09-01 DIAGNOSIS — Z00.00 ROUTINE GENERAL MEDICAL EXAMINATION AT A HEALTH CARE FACILITY: Primary | ICD-10-CM

## 2023-09-01 LAB
ALBUMIN SERPL BCG-MCNC: 4.4 G/DL (ref 3.5–5.2)
ALP SERPL-CCNC: 59 U/L (ref 35–104)
ALT SERPL W P-5'-P-CCNC: 12 U/L (ref 0–50)
ANION GAP SERPL CALCULATED.3IONS-SCNC: 11 MMOL/L (ref 7–15)
AST SERPL W P-5'-P-CCNC: 16 U/L (ref 0–45)
BASOPHILS # BLD AUTO: 0 10E3/UL (ref 0–0.2)
BASOPHILS NFR BLD AUTO: 0 %
BILIRUB SERPL-MCNC: 0.3 MG/DL
BUN SERPL-MCNC: 13.4 MG/DL (ref 6–20)
CALCIUM SERPL-MCNC: 9.6 MG/DL (ref 8.6–10)
CHLORIDE SERPL-SCNC: 103 MMOL/L (ref 98–107)
CHOLEST SERPL-MCNC: 218 MG/DL
CREAT SERPL-MCNC: 0.71 MG/DL (ref 0.51–0.95)
DEPRECATED HCO3 PLAS-SCNC: 24 MMOL/L (ref 22–29)
EOSINOPHIL # BLD AUTO: 0.3 10E3/UL (ref 0–0.7)
EOSINOPHIL NFR BLD AUTO: 5 %
ERYTHROCYTE [DISTWIDTH] IN BLOOD BY AUTOMATED COUNT: 11.2 % (ref 10–15)
FERRITIN SERPL-MCNC: 31 NG/ML (ref 6–175)
GFR SERPL CREATININE-BSD FRML MDRD: >90 ML/MIN/1.73M2
GLUCOSE SERPL-MCNC: 90 MG/DL (ref 70–99)
HBA1C MFR BLD: 5.2 % (ref 0–5.6)
HCT VFR BLD AUTO: 38.7 % (ref 35–47)
HDLC SERPL-MCNC: 58 MG/DL
HGB BLD-MCNC: 13.2 G/DL (ref 11.7–15.7)
IMM GRANULOCYTES # BLD: 0 10E3/UL
IMM GRANULOCYTES NFR BLD: 0 %
LDLC SERPL CALC-MCNC: 149 MG/DL
LYMPHOCYTES # BLD AUTO: 2 10E3/UL (ref 0.8–5.3)
LYMPHOCYTES NFR BLD AUTO: 29 %
MCH RBC QN AUTO: 31.7 PG (ref 26.5–33)
MCHC RBC AUTO-ENTMCNC: 34.1 G/DL (ref 31.5–36.5)
MCV RBC AUTO: 93 FL (ref 78–100)
MONOCYTES # BLD AUTO: 0.5 10E3/UL (ref 0–1.3)
MONOCYTES NFR BLD AUTO: 7 %
NEUTROPHILS # BLD AUTO: 4.1 10E3/UL (ref 1.6–8.3)
NEUTROPHILS NFR BLD AUTO: 60 %
NONHDLC SERPL-MCNC: 160 MG/DL
PLATELET # BLD AUTO: 291 10E3/UL (ref 150–450)
POTASSIUM SERPL-SCNC: 4 MMOL/L (ref 3.4–5.3)
PROT SERPL-MCNC: 7 G/DL (ref 6.4–8.3)
RBC # BLD AUTO: 4.17 10E6/UL (ref 3.8–5.2)
SODIUM SERPL-SCNC: 138 MMOL/L (ref 136–145)
TRIGL SERPL-MCNC: 56 MG/DL
WBC # BLD AUTO: 6.9 10E3/UL (ref 4–11)

## 2023-09-01 PROCEDURE — 86235 NUCLEAR ANTIGEN ANTIBODY: CPT | Performed by: PHYSICIAN ASSISTANT

## 2023-09-01 PROCEDURE — 82180 ASSAY OF ASCORBIC ACID: CPT | Mod: 90 | Performed by: PHYSICIAN ASSISTANT

## 2023-09-01 PROCEDURE — 36415 COLL VENOUS BLD VENIPUNCTURE: CPT | Performed by: PHYSICIAN ASSISTANT

## 2023-09-01 PROCEDURE — 99000 SPECIMEN HANDLING OFFICE-LAB: CPT | Performed by: PHYSICIAN ASSISTANT

## 2023-09-01 PROCEDURE — 99396 PREV VISIT EST AGE 40-64: CPT | Performed by: PHYSICIAN ASSISTANT

## 2023-09-01 PROCEDURE — 85025 COMPLETE CBC W/AUTO DIFF WBC: CPT | Performed by: PHYSICIAN ASSISTANT

## 2023-09-01 PROCEDURE — 86235 NUCLEAR ANTIGEN ANTIBODY: CPT | Mod: 59 | Performed by: PHYSICIAN ASSISTANT

## 2023-09-01 PROCEDURE — 99406 BEHAV CHNG SMOKING 3-10 MIN: CPT | Performed by: PHYSICIAN ASSISTANT

## 2023-09-01 PROCEDURE — 82306 VITAMIN D 25 HYDROXY: CPT | Performed by: PHYSICIAN ASSISTANT

## 2023-09-01 PROCEDURE — 83036 HEMOGLOBIN GLYCOSYLATED A1C: CPT | Performed by: PHYSICIAN ASSISTANT

## 2023-09-01 PROCEDURE — 80053 COMPREHEN METABOLIC PANEL: CPT | Performed by: PHYSICIAN ASSISTANT

## 2023-09-01 PROCEDURE — 99214 OFFICE O/P EST MOD 30 MIN: CPT | Mod: 25 | Performed by: PHYSICIAN ASSISTANT

## 2023-09-01 PROCEDURE — 80061 LIPID PANEL: CPT | Performed by: PHYSICIAN ASSISTANT

## 2023-09-01 PROCEDURE — 82728 ASSAY OF FERRITIN: CPT | Performed by: PHYSICIAN ASSISTANT

## 2023-09-01 RX ORDER — MULTIVIT,TX WITH IRON,MINERALS
100 TABLET, EXTENDED RELEASE ORAL 2 TIMES DAILY
COMMUNITY

## 2023-09-01 ASSESSMENT — ENCOUNTER SYMPTOMS
WEAKNESS: 0
NAUSEA: 0
NERVOUS/ANXIOUS: 1
COUGH: 0
CONSTIPATION: 0
PARESTHESIAS: 1
EYE PAIN: 0
HEMATOCHEZIA: 0
ARTHRALGIAS: 0
FEVER: 0
DIZZINESS: 0
JOINT SWELLING: 0
HEARTBURN: 0
CHILLS: 0
SORE THROAT: 1
PALPITATIONS: 0
DYSURIA: 0
BREAST MASS: 0
DIARRHEA: 0
MYALGIAS: 1
ABDOMINAL PAIN: 0
FREQUENCY: 0
SHORTNESS OF BREATH: 0
HEMATURIA: 0
HEADACHES: 0

## 2023-09-01 ASSESSMENT — PATIENT HEALTH QUESTIONNAIRE - PHQ9
10. IF YOU CHECKED OFF ANY PROBLEMS, HOW DIFFICULT HAVE THESE PROBLEMS MADE IT FOR YOU TO DO YOUR WORK, TAKE CARE OF THINGS AT HOME, OR GET ALONG WITH OTHER PEOPLE: SOMEWHAT DIFFICULT
SUM OF ALL RESPONSES TO PHQ QUESTIONS 1-9: 7
SUM OF ALL RESPONSES TO PHQ QUESTIONS 1-9: 7

## 2023-09-01 ASSESSMENT — PAIN SCALES - GENERAL: PAINLEVEL: MILD PAIN (2)
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
## 2023-09-01 NOTE — PATIENT INSTRUCTIONS
To schedule your imaging:    Call [phone]      Using positions, movements and exercises:    Moving your joints and muscles can help you recover from back pain. Such activity should be simple and gentle. Use the positions below, as well as walking to help relieve your pain. Try taking a short walk for a few minutes every 2-3 hours during the day. Slowly increase the amount of time you walk. Expect discomfort when you begin, but it should lessen as your back starts to heal. When your back feels better, walk daily to keep your back and body healthy.    Finding a position that is comfortable:    * Lie on your back with your legs bent. You can do this by placing a pillow under your knees or lie on the floor and rest your lower legs on the seat of a chair.  * Lie on your side with your knees bent and place a pillow between your knees.   Lie on your stomach over pillows.     Exercises To Strengthen Your Lower Back  Strong lower-back and abdominal muscles work together to support your spine. These exercises will help strengthen the muscles of the lower back. It is important that you begin exercising slowly and increase levels gradually.    Low Back Stretch  Lie on your back with your knees bent and both feet on the ground.  Slowly raise your left knee to your chest as you flatten your lower back against the floor. Hold for 5 seconds.  Relax and repeat the exercise with your right knee.  Do 10 of these exercises for each leg.  Repeat hugging both knees to your chest at the same time.  Building Lower Back Strength  Kneeling Lumbar Extension: Begin on your hands and knees. Simultaneously raise and straighten your right arm and left leg until they are parallel to the ground. Hold for 2 seconds and come back slowly to a starting position. Repeat with left arm and right leg, alternating 10 times.  Prone Lumbar Extension: Lie face down, arms extended overhead, palms on the floor. Simultaneously raise your right arm and left  leg as high as comfortably possible. Hold for 10 seconds and slowly return to start. Repeat with left arm and right leg, alternating 10 times. Gradually build up to 20 times. (Advanced: Repeat this exercise raising both arms and both legs a few inches off the floor at the same time. Hold for 5 seconds and release.)  Pelvic Tilt: Lie on the floor on your back with your knees bent at 90 degrees. Your feet should be flat on the floor. Inhale, exhale, then slowly contract your abdominal muscles bringing your navel toward your spine. Let your pelvis rock back until your lower back is flat on the floor. Hold for 10 seconds while breathing smoothly.  Abdominal Crunch: Lay on your back, flattening your lower back against the floor. Holding the tension in your abdominal muscles, take another breath and raise your shoulder blades off the ground (this is not a full sit-up). Keep your head in line with your body (don t bend your neck forward). Hold for 2 seconds, then slowly lower.    Preventive Health Recommendations  Female Ages 40 to 49    Yearly exam:   See your health care provider every year in order to  Review health changes.   Discuss preventive care.    Review your medicines if your doctor prescribed any.    Get a Pap test every three years (unless you have an abnormal result and your provider advises testing more often).    If you get Pap tests with HPV test, you only need to test every 5 years, unless you have an abnormal result. You do not need a Pap test if your uterus was removed (hysterectomy) and you have not had cancer.    You should be tested each year for STDs (sexually transmitted diseases), if you're at risk.   Ask your doctor if you should have a mammogram.    Have a colonoscopy (test for colon cancer) if someone in your family has had colon cancer or polyps before age 50.     Have a cholesterol test every 5 years.     Have a diabetes test (fasting glucose) after age 45. If you are at risk for diabetes,  you should have this test every 3 years.    Shots: Get a flu shot each year. Get a tetanus shot every 10 years.     Nutrition:   Eat at least 5 servings of fruits and vegetables each day.  Eat whole-grain bread, whole-wheat pasta and brown rice instead of white grains and rice.  Get adequate Calcium and Vitamin D.      Lifestyle  Exercise at least 150 minutes a week (an average of 30 minutes a day, 5 days a week). This will help you control your weight and prevent disease.  Limit alcohol to one drink per day.  No smoking.   Wear sunscreen to prevent skin cancer.  See your dentist every six months for an exam and cleaning.

## 2023-09-02 LAB — DEPRECATED CALCIDIOL+CALCIFEROL SERPL-MC: 24 UG/L (ref 20–75)

## 2023-09-07 LAB
ENA SS-A AB SER IA-ACNC: <0.5 U/ML
ENA SS-A AB SER IA-ACNC: NEGATIVE
ENA SS-B IGG SER IA-ACNC: <0.6 U/ML
ENA SS-B IGG SER IA-ACNC: NEGATIVE

## 2023-09-08 LAB — VIT C SERPL-MCNC: 37 UMOL/L

## 2023-09-18 ENCOUNTER — ANCILLARY PROCEDURE (OUTPATIENT)
Dept: MRI IMAGING | Facility: CLINIC | Age: 43
End: 2023-09-18
Attending: PHYSICIAN ASSISTANT
Payer: COMMERCIAL

## 2023-09-18 DIAGNOSIS — R68.2 DRY MOUTH: ICD-10-CM

## 2023-09-18 PROCEDURE — 70553 MRI BRAIN STEM W/O & W/DYE: CPT

## 2023-09-18 PROCEDURE — A9585 GADOBUTROL INJECTION: HCPCS | Mod: JZ | Performed by: PHYSICIAN ASSISTANT

## 2023-09-18 PROCEDURE — 255N000002 HC RX 255 OP 636: Mod: JZ | Performed by: PHYSICIAN ASSISTANT

## 2023-09-18 RX ORDER — GADOBUTROL 604.72 MG/ML
8 INJECTION INTRAVENOUS ONCE
Status: COMPLETED | OUTPATIENT
Start: 2023-09-18 | End: 2023-09-18

## 2023-09-18 RX ADMIN — GADOBUTROL 8 ML: 604.72 INJECTION INTRAVENOUS at 16:52

## 2023-10-16 ENCOUNTER — OFFICE VISIT (OUTPATIENT)
Dept: FAMILY MEDICINE | Facility: CLINIC | Age: 43
End: 2023-10-16
Payer: COMMERCIAL

## 2023-10-16 VITALS
DIASTOLIC BLOOD PRESSURE: 80 MMHG | RESPIRATION RATE: 16 BRPM | TEMPERATURE: 97.9 F | OXYGEN SATURATION: 96 % | SYSTOLIC BLOOD PRESSURE: 110 MMHG | WEIGHT: 174.2 LBS | BODY MASS INDEX: 29.02 KG/M2 | HEIGHT: 65 IN | HEART RATE: 70 BPM

## 2023-10-16 DIAGNOSIS — Z12.4 CERVICAL CANCER SCREENING: Primary | ICD-10-CM

## 2023-10-16 DIAGNOSIS — M54.50 CHRONIC MIDLINE LOW BACK PAIN WITHOUT SCIATICA: ICD-10-CM

## 2023-10-16 DIAGNOSIS — L40.9 PSORIASIS: ICD-10-CM

## 2023-10-16 DIAGNOSIS — G89.29 CHRONIC MIDLINE LOW BACK PAIN WITHOUT SCIATICA: ICD-10-CM

## 2023-10-16 PROCEDURE — 99213 OFFICE O/P EST LOW 20 MIN: CPT | Performed by: PHYSICIAN ASSISTANT

## 2023-10-16 PROCEDURE — G0145 SCR C/V CYTO,THINLAYER,RESCR: HCPCS | Performed by: PHYSICIAN ASSISTANT

## 2023-10-16 PROCEDURE — 87624 HPV HI-RISK TYP POOLED RSLT: CPT | Performed by: PHYSICIAN ASSISTANT

## 2023-10-16 RX ORDER — CLOBETASOL PROPIONATE 0.5 MG/ML
SOLUTION TOPICAL 2 TIMES DAILY
Qty: 50 ML | Refills: 3 | Status: SHIPPED | OUTPATIENT
Start: 2023-10-16 | End: 2023-12-07

## 2023-10-16 RX ORDER — SERTRALINE HYDROCHLORIDE 25 MG/1
TABLET, FILM COATED ORAL
COMMUNITY
Start: 2023-10-09

## 2023-10-16 ASSESSMENT — PAIN SCALES - GENERAL: PAINLEVEL: NO PAIN (1)

## 2023-10-16 ASSESSMENT — PATIENT HEALTH QUESTIONNAIRE - PHQ9: SUM OF ALL RESPONSES TO PHQ QUESTIONS 1-9: 7

## 2023-10-16 ASSESSMENT — ENCOUNTER SYMPTOMS: BACK PAIN: 1

## 2023-10-16 NOTE — PROGRESS NOTES
"  Assessment & Plan     ICD-10-CM    1. Cervical cancer screening  Z12.4 Pap Screen with HPV - recommended age 30 - 65 years      2. Psoriasis  L40.9 clobetasol (TEMOVATE) 0.05 % external solution     Adult Dermatology  Referral      3. Chronic midline low back pain without sciatica  M54.50     G89.29         Pap collected today, results pending    Clobetasol refilled. Recommend dermatology visit for further recommendations and evaluation given difficult-to-treat toenail area, referral placed.    Continue follow-up with chiropractor as scheduled, consider referral to PT if no relief within 3 visits.    DANELLE Anaya [facility] NAI Sin is a 43 year old, presenting for the following health issues:  Gyn Exam (PAP Exam. ) and Back Pain        10/16/2023     3:25 PM   Additional Questions   Roomed by Roma MCCRACKEN       History of Present Illness       Reason for visit:  Pap smear    She eats 2-3 servings of fruits and vegetables daily.She consumes 0 sweetened beverage(s) daily.She exercises with enough effort to increase her heart rate 9 or less minutes per day.  She exercises with enough effort to increase her heart rate 3 or less days per week.   She is taking medications regularly.     Presents today for Pap smear - was not completed during recent annual due to menses at time of exam.    Requests refill of clobetasol for psoriasis. Getting rash under toenails, trying to get clobetasol underneath nails. Working well for other areas of psoriasis.    Continues to struggle with low back pain. Saw massage therapist without relief. Had first visit with chiropractor so is hopeful this will help.      Review of Systems   Musculoskeletal:  Positive for back pain.            Objective    /80   Pulse 70   Temp 97.9  F (36.6  C) (Temporal)   Resp 16   Ht 1.655 m (5' 5.16\")   Wt 79 kg (174 lb 3.2 oz)   LMP 08/30/2023 (Exact Date)   SpO2 96%   BMI 28.85 kg/m  "   Body mass index is 28.85 kg/m .  Physical Exam   GENERAL:  WDWN, no acute distress  PSYCH: pleasant, cooperative  EYES: no discharge, no injection  HENT:  Normocephalic. Moist mucus membranes.  NECK:  Supple, symmetric  EXTREMITIES:  No gross deformities, moves all 4 limbs spontaneously, no peripheral edema  SKIN:  Warm and dry. Well demarcated scaly patches on tips of all toes and visible underneath all toenails; patch along Lt shin.  : normal female external genitalia. Vaginal mucosa pink, moist. Cervix normal without masses or discharge.  NEUROLOGIC:  alert, sensation grossly intact.

## 2023-10-19 LAB
BKR LAB AP GYN ADEQUACY: NORMAL
BKR LAB AP GYN INTERPRETATION: NORMAL
BKR LAB AP HPV REFLEX: NORMAL
BKR LAB AP PREVIOUS ABNORMAL: NORMAL
PATH REPORT.COMMENTS IMP SPEC: NORMAL
PATH REPORT.COMMENTS IMP SPEC: NORMAL
PATH REPORT.RELEVANT HX SPEC: NORMAL

## 2023-10-20 LAB
HUMAN PAPILLOMA VIRUS 16 DNA: NEGATIVE
HUMAN PAPILLOMA VIRUS 18 DNA: NEGATIVE
HUMAN PAPILLOMA VIRUS FINAL DIAGNOSIS: NORMAL
HUMAN PAPILLOMA VIRUS OTHER HR: NEGATIVE

## 2023-12-07 ENCOUNTER — OFFICE VISIT (OUTPATIENT)
Dept: FAMILY MEDICINE | Facility: CLINIC | Age: 43
End: 2023-12-07
Payer: COMMERCIAL

## 2023-12-07 DIAGNOSIS — L40.9 PSORIASIS: ICD-10-CM

## 2023-12-07 DIAGNOSIS — B36.0 TINEA VERSICOLOR: Primary | ICD-10-CM

## 2023-12-07 LAB
KOH PREPARATION: NORMAL
KOH PREPARATION: NORMAL

## 2023-12-07 PROCEDURE — 99214 OFFICE O/P EST MOD 30 MIN: CPT | Performed by: PHYSICIAN ASSISTANT

## 2023-12-07 PROCEDURE — 87220 TISSUE EXAM FOR FUNGI: CPT | Performed by: PHYSICIAN ASSISTANT

## 2023-12-07 RX ORDER — TRIAMCINOLONE ACETONIDE 1 MG/G
CREAM TOPICAL 2 TIMES DAILY
Qty: 85.2 G | Refills: 1 | Status: SHIPPED | OUTPATIENT
Start: 2023-12-07

## 2023-12-07 RX ORDER — CLOBETASOL PROPIONATE 0.5 MG/ML
SOLUTION TOPICAL 2 TIMES DAILY
Qty: 50 ML | Refills: 3 | Status: SHIPPED | OUTPATIENT
Start: 2023-12-07

## 2023-12-07 RX ORDER — BETAMETHASONE DIPROPIONATE 0.5 MG/ML
LOTION, AUGMENTED TOPICAL 2 TIMES DAILY
Qty: 60 ML | Refills: 1 | Status: SHIPPED | OUTPATIENT
Start: 2023-12-07

## 2023-12-07 RX ORDER — KETOCONAZOLE 20 MG/ML
SHAMPOO TOPICAL
Qty: 120 ML | Refills: 0 | Status: SHIPPED | OUTPATIENT
Start: 2023-12-07

## 2023-12-07 ASSESSMENT — PAIN SCALES - GENERAL: PAINLEVEL: NO PAIN (0)

## 2023-12-07 NOTE — PROGRESS NOTES
[facility] Dermatology Note  Encounter Date: Dec 7, 2023  Office Visit      Dermatology Problem List:  1. Psoriasis  - Previous rx: TAC, Diprolene lotion.   - Current rx: Clobetasol solution 0.05%, Betamethasone 0.05% lotion.   2. Tinea Versicolor - KOH negative  - ketoconazole 2% shampoo  ____________________________________________    Assessment & Plan:  # Psoriasis, well controlled on topicals currently, however in past few months toenails have flared and become more affected  - Continue use of clobetasol 0.05% solution, apply to affected areas on scalp, body BID for 2-4 weeks (just not face or body folds). Refilled Rx  - Start betamethasone 0.05 % lotion to apply BID to her toe nails.   - steroid edu given  - monitor for joint sx- was previously referred to rheum in 2021 for joint pain/stiffness    # Tinea versicolor - neck primarily , with some on the R shoulder and upper mid back  -  KOH performed today. Negative  - Start on ketoconazole 2% shampoo, Apply topically to the trunk, leave on for 3-5min prior to rinsing, repeat for 7 days    Procedures Performed:   - KOH prep was obtained and interpreted as negative    Follow-up: 6 month(s) in-person, or earlier for new or changing lesions    Staff and scribe     Scribe Disclosure:   I, KARSON JUAREZ, am serving as a scribe; to document services personally performed by Tram Lemons PA-C -based on data collection and the provider's statements to me.     Provider Disclosure:  I agree with above History, Review of Systems, Physical exam and Plan.  I have reviewed the content of the documentation and have edited it as needed. I have personally performed the services documented here and the documentation accurately represents those services and the decisions I have made.      Electronically signed by:    All risks, benefits and alternatives were discussed with patient.  Patient is in agreement and understands the assessment and plan.  All  questions were answered.    Tram Lemons PA-C, MPAS  [facility]: Phone: [phone], Fax: [phone]  [facility]: Phone: [phone],  Fax: [phone]  [facility]: Phone: [phone], Fax: [phone]  ____________________________________________    CC: Psoriasis (Follow-up on Psoriasis on left schin, neck, under toe nails)      Reviewed patients past medical history and pertinent chart review prior to patient's visit today.     HPI:  Ms. January Dickens is a 43 year old female who presents today as a return patient for Psoriasis follow up.     Today patient reports that she has psoriasis on her left shin, neck, and under her toe nails.  Patient reports that her spots seem to switch areas.     Patient is otherwise feeling well, without additional concerns.    Labs:  N/A    Physical Exam:  Vitals: LMP 08/30/2023 (Exact Date)   SKIN: Total skin excluding the undergarment areas was performed. The exam included the head/face, neck, both arms, chest, back, abdomen, both legs, digits and/or nails.    - onycholysis on all of her toe nails.    - Pink inflammed white thickened plaques on occipital scalp.   - lifting of distal nail on left foot toenail and right hand fingernail  - faint pink to tan scaly macules on the posterior neck, R chest and upper mid back  - No other lesions of concern on areas examined.     Medications:  Current Outpatient Medications   Medication    augmented betamethasone dipropionate (DIPROLENE) 0.05 % external lotion    clobetasol (TEMOVATE) 0.05 % external solution    hydrOXYzine (ATARAX) 25 MG tablet    magnesium gluconate (MAGONATE) 250 MG tablet    triamcinolone (KENALOG) 0.1 % external cream    sertraline (ZOLOFT) 25 MG tablet     No current facility-administered medications for this visit.      Past Medical/Surgical History:   Patient Active Problem List   Diagnosis     Tobacco use disorder    Major depressive disorder, recurrent episode, moderate (H)    Gastroesophageal reflux disease without esophagitis    MERI (generalized anxiety disorder)    Right leg numbness     Past Medical History:   Diagnosis Date    Depressive disorder 1998    MERI (generalized anxiety disorder) 08/08/2017    Gastroesophageal reflux disease without esophagitis 04/07/2017    Infectious mononucleosis     Major depressive disorder, recurrent episode, moderate (H) 04/07/2017    Other psoriasis     Right leg numbness 03/23/2018

## 2023-12-07 NOTE — LETTER
12/7/2023         RE: January Dickens  [id number]   [address]  [facility] [address]        Dear Colleague,    Thank you for referring your patient, January Dickens, to the [facility]. Please see a copy of my visit note below.    [facility] Dermatology Note  Encounter Date: Dec 7, 2023  Office Visit      Dermatology Problem List:  1. Psoriasis  - Previous rx: TAC, Diprolene lotion.   - Current rx: Clobetasol solution 0.05%, Betamethasone 0.05% lotion.   2. Tinea Versicolor - KOH negative  - ketoconazole 2% shampoo  ____________________________________________    Assessment & Plan:  # Psoriasis, well controlled on topicals currently, however in past few months toenails have flared and become more affected  - Continue use of clobetasol 0.05% solution, apply to affected areas on scalp, body BID for 2-4 weeks (just not face or body folds). Refilled Rx  - Start betamethasone 0.05 % lotion to apply BID to her toe nails.   - steroid edu given  - monitor for joint sx- was previously referred to rheum in 2021 for joint pain/stiffness    # Tinea versicolor - neck primarily , with some on the R shoulder and upper mid back  -  KOH performed today. Negative  - Start on ketoconazole 2% shampoo, Apply topically to the trunk, leave on for 3-5min prior to rinsing, repeat for 7 days    Procedures Performed:   - KOH prep was obtained and interpreted as negative    Follow-up: 6 month(s) in-person, or earlier for new or changing lesions    Staff and scribe     Scribe Disclosure:   I, KARSON JUAREZ, am serving as a scribe; to document services personally performed by Tram Lemons PA-C -based on data collection and the provider's statements to me.     Provider Disclosure:  I agree with above History, Review of Systems, Physical exam and Plan.  I have reviewed the content of the documentation and have edited it as needed. I have personally performed the services documented here and  the documentation accurately represents those services and the decisions I have made.      Electronically signed by:    All risks, benefits and alternatives were discussed with patient.  Patient is in agreement and understands the assessment and plan.  All questions were answered.    Tram Lemons PA-C, MPAS  [facility]: Phone: [phone], Fax: [phone]  [facility]: Phone: [phone],  Fax: [phone]  [facility]: Phone: [phone], Fax: [phone]  ____________________________________________    CC: Psoriasis (Follow-up on Psoriasis on left schin, neck, under toe nails)      Reviewed patients past medical history and pertinent chart review prior to patient's visit today.     HPI:  Ms. January Dickens is a 43 year old female who presents today as a return patient for Psoriasis follow up.     Today patient reports that she has psoriasis on her left shin, neck, and under her toe nails.  Patient reports that her spots seem to switch areas.     Patient is otherwise feeling well, without additional concerns.    Labs:  N/A    Physical Exam:  Vitals: LMP 08/30/2023 (Exact Date)   SKIN: Total skin excluding the undergarment areas was performed. The exam included the head/face, neck, both arms, chest, back, abdomen, both legs, digits and/or nails.    - onycholysis on all of her toe nails.    - Pink inflammed white thickened plaques on occipital scalp.   - lifting of distal nail on left foot toenail and right hand fingernail  - faint pink to tan scaly macules on the posterior neck, R chest and upper mid back  - No other lesions of concern on areas examined.     Medications:  Current Outpatient Medications   Medication     augmented betamethasone dipropionate (DIPROLENE) 0.05 % external lotion     clobetasol (TEMOVATE) 0.05 % external solution     hydrOXYzine (ATARAX) 25 MG tablet     magnesium  gluconate (MAGONATE) 250 MG tablet     triamcinolone (KENALOG) 0.1 % external cream     sertraline (ZOLOFT) 25 MG tablet     No current facility-administered medications for this visit.      Past Medical/Surgical History:   Patient Active Problem List   Diagnosis     Tobacco use disorder     Major depressive disorder, recurrent episode, moderate (H)     Gastroesophageal reflux disease without esophagitis     MERI (generalized anxiety disorder)     Right leg numbness     Past Medical History:   Diagnosis Date     Depressive disorder 1998     MERI (generalized anxiety disorder) 08/08/2017     Gastroesophageal reflux disease without esophagitis 04/07/2017     Infectious mononucleosis      Major depressive disorder, recurrent episode, moderate (H) 04/07/2017     Other psoriasis      Right leg numbness 03/23/2018                        Again, thank you for allowing me to participate in the care of your patient.        Sincerely,        Tram Lemons PA-C

## 2024-02-27 NOTE — ED TRIAGE NOTES
Patient has been having anxiety attacks and palpitation for some time now, she has been taking an old script of ativan that calm her but don't help with heart palpitations.  She is unvaccinated and is getting her first Vac on Saturday and is is having lots of anxiety over it.  Possible Empath if cardiac r/o.  
thinking about quitting

## 2024-03-17 ENCOUNTER — HEALTH MAINTENANCE LETTER (OUTPATIENT)
Age: 44
End: 2024-03-17

## 2024-05-08 ENCOUNTER — ANCILLARY PROCEDURE (OUTPATIENT)
Dept: MAMMOGRAPHY | Facility: CLINIC | Age: 44
End: 2024-05-08
Attending: FAMILY MEDICINE
Payer: COMMERCIAL

## 2024-05-08 DIAGNOSIS — Z12.31 VISIT FOR SCREENING MAMMOGRAM: ICD-10-CM

## 2024-05-08 PROCEDURE — 77067 SCR MAMMO BI INCL CAD: CPT | Mod: TC | Performed by: RADIOLOGY

## 2024-08-02 ENCOUNTER — PATIENT OUTREACH (OUTPATIENT)
Dept: CARE COORDINATION | Facility: CLINIC | Age: 44
End: 2024-08-02
Payer: COMMERCIAL

## 2024-08-16 ENCOUNTER — PATIENT OUTREACH (OUTPATIENT)
Dept: CARE COORDINATION | Facility: CLINIC | Age: 44
End: 2024-08-16
Payer: COMMERCIAL

## 2024-10-13 ENCOUNTER — HEALTH MAINTENANCE LETTER (OUTPATIENT)
Age: 44
End: 2024-10-13

## 2024-12-02 ENCOUNTER — MYC REFILL (OUTPATIENT)
Dept: FAMILY MEDICINE | Facility: CLINIC | Age: 44
End: 2024-12-02
Payer: COMMERCIAL

## 2024-12-02 RX ORDER — SERTRALINE HYDROCHLORIDE 25 MG/1
TABLET, FILM COATED ORAL
Status: CANCELLED | OUTPATIENT
Start: 2024-12-02

## 2024-12-02 NOTE — TELEPHONE ENCOUNTER
Clinic RN: Please investigate patient's chart or contact patient if the information cannot be found because the medication is listed as historical or discontinued. Confirm patient is taking this medication. Document findings and route refill encounter to provider for approval or denial.  Kylee Kraft RN, BSN  [facility]

## 2024-12-02 NOTE — TELEPHONE ENCOUNTER
See [employer] message.     Replied via [employer].     Ninfa HESS RN   Clinic RN  [facility]

## 2025-02-24 PROBLEM — M75.00 FROZEN SHOULDER SYNDROME: Status: ACTIVE | Noted: 2024-09-01

## 2025-02-24 PROBLEM — R53.83 LETHARGY: Status: ACTIVE | Noted: 2024-01-01

## 2025-02-24 RX ORDER — SERTRALINE HYDROCHLORIDE 100 MG/1
1 TABLET, FILM COATED ORAL
COMMUNITY
Start: 2024-08-13

## 2025-02-24 RX ORDER — CHLORHEXIDINE GLUCONATE ORAL RINSE 1.2 MG/ML
SOLUTION DENTAL
COMMUNITY
Start: 2024-04-29 | End: 2025-02-27
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
## 2025-02-27 ENCOUNTER — OFFICE VISIT (OUTPATIENT)
Dept: OBGYN | Facility: CLINIC | Age: 45
End: 2025-02-27
Payer: COMMERCIAL

## 2025-02-27 VITALS
SYSTOLIC BLOOD PRESSURE: 118 MMHG | HEIGHT: 66 IN | WEIGHT: 203.6 LBS | DIASTOLIC BLOOD PRESSURE: 80 MMHG | BODY MASS INDEX: 32.72 KG/M2

## 2025-02-27 DIAGNOSIS — Z01.419 ENCOUNTER FOR GYNECOLOGICAL EXAMINATION WITHOUT ABNORMAL FINDING: Primary | ICD-10-CM

## 2025-02-27 DIAGNOSIS — F33.1 MAJOR DEPRESSIVE DISORDER, RECURRENT EPISODE, MODERATE (H): ICD-10-CM

## 2025-02-27 DIAGNOSIS — N95.1 PERIMENOPAUSE: ICD-10-CM

## 2025-02-27 RX ORDER — VIT C/B6/B5/MAGNESIUM/HERB 173 50-5-6-5MG
CAPSULE ORAL
COMMUNITY

## 2025-02-27 RX ORDER — NORETHINDRONE ACETATE AND ETHINYL ESTRADIOL 1MG-20(21)
1 KIT ORAL DAILY
Qty: 84 TABLET | Refills: 3 | Status: SHIPPED | OUTPATIENT
Start: 2025-02-27

## 2025-02-27 ASSESSMENT — ANXIETY QUESTIONNAIRES
5. BEING SO RESTLESS THAT IT IS HARD TO SIT STILL: NOT AT ALL
6. BECOMING EASILY ANNOYED OR IRRITABLE: NOT AT ALL
GAD7 TOTAL SCORE: 4
GAD7 TOTAL SCORE: 4
3. WORRYING TOO MUCH ABOUT DIFFERENT THINGS: SEVERAL DAYS
1. FEELING NERVOUS, ANXIOUS, OR ON EDGE: NOT AT ALL
2. NOT BEING ABLE TO STOP OR CONTROL WORRYING: SEVERAL DAYS
7. FEELING AFRAID AS IF SOMETHING AWFUL MIGHT HAPPEN: NOT AT ALL
IF YOU CHECKED OFF ANY PROBLEMS ON THIS QUESTIONNAIRE, HOW DIFFICULT HAVE THESE PROBLEMS MADE IT FOR YOU TO DO YOUR WORK, TAKE CARE OF THINGS AT HOME, OR GET ALONG WITH OTHER PEOPLE: NOT DIFFICULT AT ALL

## 2025-02-27 ASSESSMENT — PATIENT HEALTH QUESTIONNAIRE - PHQ9
5. POOR APPETITE OR OVEREATING: MORE THAN HALF THE DAYS
SUM OF ALL RESPONSES TO PHQ QUESTIONS 1-9: 5

## 2025-03-27 ENCOUNTER — TELEPHONE (OUTPATIENT)
Dept: OBGYN | Facility: CLINIC | Age: 45
End: 2025-03-27
Payer: COMMERCIAL

## 2025-03-27 NOTE — TELEPHONE ENCOUNTER
"OV 2/27/25 with Shelly Hawk NP:    New patient to me here today for her annual GYN exam and to discuss perimenopause. She started menopausal symptoms in her mid 30s then really started to notice weight gain and brain fog at age 40.     We discussed perimenopause at great length today. We will start her on low-dose oral contraceptive tablets.     norethindrone-ethinyl estradiol (JUNEL FE 1/20) 1-20 MG-MCG tablet -    Called and spoke to pt -    Reports she has been getting getting heart palpitations - initially though this was related to perimenopause   Would last for 30 seconds    Noticing she has been having more episodes that last longer since starting OCP    Pt reports she started OCP 1.5 weeks ago (Is on Thursday of her second week)    Feels like the heart palpitations are more frequent and notices them more often     This morning - has been feeling heart palpitation for a few hours - feels it in her throat  Feels a \"flutter\" in her throat - no difficulties swallowing     Had some coffee this AM - wondering if she is now having a sensitivity to caffeine as she noticed this happened after she had her coffee this morning     Also had a few glasses of wine last night, wondering if this could be contributing to symptoms    Is feeling a little anxious   No issues with breathing/SOB  Denies chest pain    ER precautions reviewed if pt experiencing chest pain or SOB, should report to ER. Pt verbalizes understanding and reports no further questions    Routing to provider to advise     Alize Jacome RN on 3/27/2025 at 1:18 PM  [facility] Triage               "

## 2025-03-27 NOTE — TELEPHONE ENCOUNTER
So have her stop her pill for a few weeks and see how she feels.    I would follow up with Dr. Matthews and get an EKG.    Heart palpitations can be 2/2 perimenopause.

## 2025-03-27 NOTE — TELEPHONE ENCOUNTER
Caller reporting the following red-flag symptom(s): Heart palpitations after starting estradiol medication     Per the system red-flag symptom policy, patient was instructed to:  Sent high priority TE to clinic and reached out Via secure chat.     Action:  Message sent to the clinic

## 2025-05-14 DIAGNOSIS — N95.1 PERIMENOPAUSE: ICD-10-CM

## 2025-05-14 RX ORDER — NORETHINDRONE ACETATE AND ETHINYL ESTRADIOL 1MG-20(21)
1 KIT ORAL DAILY
Qty: 84 TABLET | Refills: 2 | Status: SHIPPED | OUTPATIENT
Start: 2025-05-14

## 2025-05-14 NOTE — TELEPHONE ENCOUNTER
Requested Prescriptions   Pending Prescriptions Disp Refills    norethindrone-ethinyl estradiol (FLORENTINO JOSE 1/20) 1-20 MG-MCG tablet [Pharmacy Med Name: NORETH/E ES FE(FLORENTINO)TAB 28'S 1/20 1/20]  0       Contraceptives Protocol Failed - 5/14/2025  1:28 PM        Failed - Medication is active on med list and the sig matches. RN to manually verify dose and sig if red X/fail.     If the protocol passes (green check), you do not need to verify med dose and sig.    A prescription matches if they are the same clinical intention.    For Example: once daily and every morning are the same.    The protocol can not identify upper and lower case letters as matching and will fail.     For Example: Take 1 tablet (50 mg) by mouth daily     TAKE 1 TABLET (50 MG) BY MOUTH DAILY    For all fails (red x), verify dose and sig.    If the refill does match what is on file, the RN can still proceed to approve the refill request.       If they do not match, route to the appropriate provider.             Failed - Medication indicated for associated diagnosis     Medication is associated with one or more of the following diagnoses:  Contraception  Acne  Dysmenorrhea  Menorrhagia  Amenorrhea  PCOS  Premenstrual Dysphoric Disorder  Irregular menses  Endometriosis  Contraceptive counseling  Finding of menstrual bleeding  Education about oral contraception  Uses contraception  Initial prescription of oral contraception  Oral contraception-no problem  Oral contraceptive repeat          Passed - Patient is not a current smoker if age is 35 or older        Passed - Recent (12 mo) or future (90 days) visit within the authorizing provider's specialty     The patient must have completed an in-person or virtual visit within the past 12 months or has a future visit scheduled within the next 90 days with the authorizing provider s specialty.  Urgent care and e-visits do not qualify as an office visit for this protocol.          Passed - No active  pregnancy on record        Passed - No positive pregnancy test in past 12 months           Last Written Prescription Date:  2/27/25  Last Fill Quantity: 84,  # refills: 3   Last office visit: 2/27/2025 ; last virtual visit: Visit date not found with prescribing provider:  Shelly Hawk NP   Future Office Visit: none     Pharmacy comment: YOUR PATIENT REQUESTED AND EXPRESSLY CONSENTED FOR THIS RX TO BE FILLED AT OUR PHARMACY. THIS INFORMATION IS BASED ON A PRIOR RX CLAIM. PLEASE VERIFY CURRENT THERAPY. 90-DAY SUPPLY REQUESTED.     Pt requesting pharmacy change. Rx approved     Alize Jacome RN on 5/14/2025 at 1:30 PM  [facility] Triage